# Patient Record
Sex: FEMALE | Race: WHITE
[De-identification: names, ages, dates, MRNs, and addresses within clinical notes are randomized per-mention and may not be internally consistent; named-entity substitution may affect disease eponyms.]

---

## 2020-10-29 NOTE — NUR
DUANE WARD admitted to room 421-1, with an admitting diagnosis of HYPERKALEMIA, on 
10/29/20 from ED via [facility], accompanied by STAFF. DUANE WARD introduced to 
surroundings, call light, bed controls, phone, TV, temperature control, lights, meal times, 
smoking policy, visitor policy, side rail policy, bathrooms and showers.  Patient Rights 
given to patient in the handbook. DUANE WARD verbalizes understanding that [facility] is not responsible for the loss or damage to any personal effects or valuables that 
are kept in the patients posession during their hospitalization.  The following Patient Care 
Plans were discussed with the PT: Discharge Planning, PAIN, AND ELECTROLYTE IMBALANCE. 
DUANE WARD verbalizes understanding of Interdisciplinary Patient Education. Patient 
and/or family were informed about the Rapid Response Team and its purpose.

## 2020-10-29 NOTE — NUR
OPEN WOUNDS NOTED ON LOWER LEGS. WEEPING, SKIN DISCOLORED. PT STATES SHE HAS OPEN SORES ON 
BACK OF HER THIGHS FROM SLIDING OUT OF BED AT [facility]. REFUSED TO TURN SO 
WOUNDS CAN BE ASSESSED.

## 2020-10-29 NOTE — ED GENERAL
General


Chief Complaint:  General Problems/Pain


Stated Complaint:  HIGH POTASSIUM


Nursing Triage Note:  


Pt to ED via EMS from [facility] for high potassium levels.  EMS 


reports yesterday levels were 6.6 and today's level was 6.1.  Pt has bilateral 


weeping wounds to lower extremeties.


Nursing Sepsis Screen:  No Definite Risk


Source of Information:  Patient


Exam Limitations:  No Limitations





History of Present Illness


Date Seen by Provider:  Oct 29, 2020


Time Seen by Provider:  14:15


Initial Comments


Patient is a 55-year-old female who presents to the emergency room today with a 

chief complaint of elevated serum potassium.  Patient resides at a local nursing

home and has been having daily labs drawn.  It was noted yesterday and today 

that the patient's potassium level was above 6.0.  Patient herself has no 

complaints at this time other than she has chronic lymphedema and drainage from 

wounds on her bilateral lower extremities.  Patient denies any chest pain, 

shortness of breath, abdominal pain, nausea, vomiting, diarrhea.  Patient denies

any recent febrile illnesses, cough, congestion, nasal drainage.





All other review of systems reviewed and negative except as stated above.


Timing/Duration:  2-3 Days





Allergies and Home Medications


Allergies


Coded Allergies:  


     diphenhydramine (Verified  Allergy, Unknown, 20)


   


   RESTLESS LEGSSD





Home Medications


Allopurinol 100 Mg Tablet, 100 MG PO BID, (Reported)


Baclofen 10 Mg Tablet, 10 MG PO QID, (Reported)


Cephalexin 500 Mg Capsule, 500 MG PO BID


   Prescribed by: ROBERT VILLALTA on 7/15/20 0932


Diclofenac Sodium 100 Gm Gel..gram., 1 APPLIC TD QID PRN for PAIN-BREAKTHROUGH, 

(Reported)


   APPLY 4 GRAMS TO EACH KNEE NOT TO EXCEED 32 GRAMS PER DAY 


Duloxetine HCl 30 Mg Capsule.dr, 30 MG PO DAILY, (Reported)


Fentanyl 1 Each Patch.td72, 75 MCG TD Q72H, (Reported)


Furosemide 40 Mg Tablet, 40 MG PO DAILY, (Reported)


Ibuprofen 200 Mg Capsule, 200 MG PO Q6H PRN for PAIN-MILD (1-4), (Reported)


Lactobacillus Acidophilus 1 Each Capsule, 1 EACH PO TIDWM


   Prescribed by: ROBERT VILLALTA on 7/15/20 0932


Levothyroxine Sodium 50 Mcg Tablet, 50 MCG PO DAILY, (Reported)


Metformin HCl 500 Mg Tablet, 500 MG PO DAILY, (Reported)


Metolazone 2.5 Mg Tablet, 2.5 MG PO DAILY, (Reported)


Nystatin 15 Gm Oint...g., 1 APPLIC TP BID, (Reported)


Oxycodone HCl/Acetaminophen 1 Each Tablet, 1 EACH PO Q6H PRN for PAIN-MODERATE 

(5-7), (Reported)


Pantoprazole Sodium 40 Mg Tablet.dr, 40 MG PO DAILY, (Reported)


Potassium Chloride 10 Meq Tab.er.prt, 20 MEQ PO DAILY, (Reported)


   TAKE TWO TABLETS 


Pramipexole Di-HCl 1 Mg Tablet, 1 MG PO TID, (Reported)


Sulfamethoxazole/Trimethoprim 1 Each Tablet, 1 EACH PO BID


   Prescribed by: ROBERT VILLALTA on 7/15/20 0932


Warfarin Sodium 10 Mg Tablet, 10 MG PO TUESDAY AND THURSDAY, (Reported)


   TAKE ONE TABLET IN THE EVENING OF EVERY TUESDAY AND WEDNESDAY 


Warfarin Sodium 5 Mg Tablet, 5 MG PO SUN MON WED FRI SAT, (Reported)


   TAKE ONE TABLET IN THE EVENING OF EVERY , MONDAY, WEDNESDAY, FRIDAY AND

SATURDAY FOR DVT 





Patient Home Medication List


Home Medication List Reviewed:  Yes





Review of Systems


Review of Systems


Constitutional:  no symptoms reported


EENTM:  no symptoms reported


Respiratory:  no symptoms reported; No short of breath


Cardiovascular:  no symptoms reported


Gastrointestinal:  no symptoms reported


Genitourinary:  no symptoms reported


Pregnant:  No


Musculoskeletal:  joint pain (right hip and thigh), muscle cramps


Skin:  lesions (bilateral lower extremities)





Past Medical-Social-Family Hx


Patient Social History


Alcohol Use:  Denies Use


Recreational Drug Use:  No


Smoking Status:  Former Smoker


Type Used:  Cigarettes


2nd Hand Smoke Exposure:  No


Recent Foreign Travel:  No


Contact w/Someone Who Travel:  No


Recent Infectious Disease Expo:  No


Recent Hopitalizations:  No


Physical Abuse:  No


Sexual Abuse:  No


Mistreated:  No


Fear:  No





Immunizations Up To Date


Tetanus Booster (TDap):  Unknown


Date of Pneumonia Vaccine:  2020





Seasonal Allergies


Seasonal Allergies:  No





Past Medical History


Surgeries:  Yes


Appendectomy,  Section, Gallbladder, Orthopedic


Respiratory:  Yes


Sleep Apnea


Cardiac:  No


Neurological:  Yes


Neuropathy


Genitourinary:  No


Gastrointestinal:  Yes


Gastroesophageal Reflux


Musculoskeletal:  Yes


Fibromyalgia


Endocrine:  Yes


Diabetes, Non-Insulin dep


HEENT:  No


Cancer:  No


Psychosocial:  No


Integumentary:  Yes


Recent Skin Changes


Blood Disorders:  No





Physical Exam


Vital Signs





Vital Signs - First Documented








 10/29/20





 14:30


 


Temp 35.7


 


Pulse 76


 


Resp 20


 


B/P (MAP) 133/91 (105)


 


Pulse Ox 97


 


O2 Delivery Room Air





Capillary Refill : Less Than 3 Seconds


Height, Weight, BMI


Height: '"


Weight: lbs. oz. kg; 68.00 BMI


Method:


General Appearance:  No Apparent Distress, Obese


Eyes:  Bilateral Eye Normal Inspection, Bilateral Eye PERRL, Bilateral Eye EOMI


Respiratory:  Lungs Clear, Normal Breath Sounds, No Accessory Muscle Use, No Res

piratory Distress


Cardiovascular:  Regular Rate, Rhythm, No Murmur


Gastrointestinal:  Normal Bowel Sounds, Non Tender, Other (morbid obsetiy )


Extremity:  Inflammation, Pedal Edema, Other (2 open wounds to the left leg 

about 2cm diameter, most dorsal lesion actively draining copious amounts of 

clear serosanguinous fluids)


Neurologic/Psychiatric:  Alert, Oriented x3, No Motor/Sensory Deficits, Normal 

Mood/Affect





Progress/Results/Core Measures


Suspected Sepsis


Recent Fever Within 48 Hours:  No


Infection Criteria Present:  None


New/Unexplained  Altered Menta:  No


Sepsis Screen:  No Definite Risk


SIRS


Temperature: 


Pulse: 76 


Respiratory Rate: 20


 


Laboratory Tests


10/29/20 14:35: White Blood Count 7.8


Blood Pressure 133 /91 


Mean: 105


 


Laboratory Tests


10/29/20 14:35: 


Creatinine 1.60H, INR Comment 2.7H, Platelet Count 433H








Results/Orders


Lab Results





Laboratory Tests








Test


 10/29/20


14:35 Range/Units


 


 


White Blood Count


 7.8 


 4.3-11.0


10^3/uL


 


Red Blood Count


 3.96 


 3.80-5.11


10^6/uL


 


Hemoglobin 11.2 L 11.5-16.0  g/dL


 


Hematocrit 36  35-52  %


 


Mean Corpuscular Volume 90  80-99  fL


 


Mean Corpuscular Hemoglobin 28  25-34  pg


 


Mean Corpuscular Hemoglobin


Concent 32 


 32-36  g/dL





 


Red Cell Distribution Width 17.2 H 10.0-14.5  %


 


Platelet Count


 433 H


 130-400


10^3/uL


 


Mean Platelet Volume 9.2  9.0-12.2  fL


 


Immature Granulocyte % (Auto) 1   %


 


Neutrophils (%) (Auto) 70  42-75  %


 


Lymphocytes (%) (Auto) 15  12-44  %


 


Monocytes (%) (Auto) 8  0-12  %


 


Eosinophils (%) (Auto) 7  0-10  %


 


Basophils (%) (Auto) 1  0-10  %


 


Neutrophils # (Auto)


 5.4 


 1.8-7.8


10^3/uL


 


Lymphocytes # (Auto)


 1.1 


 1.0-4.0


10^3/uL


 


Monocytes # (Auto)


 0.6 


 0.0-1.0


10^3/uL


 


Eosinophils # (Auto)


 0.5 H


 0.0-0.3


10^3/uL


 


Basophils # (Auto)


 0.1 


 0.0-0.1


10^3/uL


 


Immature Granulocyte # (Auto)


 0.1 


 0.0-0.1


10^3/uL


 


Prothrombin Time 28.7 H 12.2-14.7  SEC


 


INR Comment 2.7 H 0.8-1.4  


 


Sodium Level 127 L 135-145  MMOL/L


 


Potassium Level 6.1 H 3.6-5.0  MMOL/L


 


Chloride Level 97 L   MMOL/L


 


Carbon Dioxide Level 19 L 21-32  MMOL/L


 


Anion Gap 11  5-14  MMOL/L


 


Blood Urea Nitrogen 50 H 7-18  MG/DL


 


Creatinine


 1.60 H


 0.60-1.30


MG/DL


 


Estimat Glomerular Filtration


Rate 34 


  





 


BUN/Creatinine Ratio 31   


 


Glucose Level 133 H   MG/DL


 


Calcium Level 9.5  8.5-10.1  MG/DL








My Orders





Orders - TRACEY GONZALEZ MD


Basic Metabolic Panel (10/29/20 14:43)


Cbc With Automated Diff (10/29/20 14:43)


Protime With Inr (10/29/20 14:43)


Ed Iv/Invasive Line Start (10/29/20 14:43)


D50w (Emergency) Syringe (Dextrose 50% 5 (10/29/20 15:30)


Insulin (Regular) Human (Novolin R (Per (10/29/20 15:30)


Sodium Bicarbonate 8.4% Vial (Sodium Bic (10/29/20 15:30)


Ekg Tracing (10/29/20 15:23)


Furosemide  Injection (Lasix  Injection) (10/29/20 15:45)


Baclofen Tablet (Lioresal Tablet) (10/29/20 15:55)





Medications Given in ED





Current Medications








 Medications  Dose


 Ordered  Sig/Mildred


 Route  Start Time


 Stop Time Status Last Admin


Dose Admin


 


 Dextrose  25 ml  ONCE  ONCE


 IV  10/29/20 15:30


 10/29/20 15:31 DC 10/29/20 15:36


25 ML


 


 Insulin Human


 Regular  10 unit  ONCE  ONCE


 SC  10/29/20 15:30


 10/29/20 15:31 DC 10/29/20 15:35


10 UNIT


 


 Sodium Bicarbonate  50 meq  ONCE  ONCE


 IV  10/29/20 15:30


 10/29/20 15:31 DC 10/29/20 15:38


50 MEQ








Vital Signs/I&O











 10/29/20





 14:30


 


Temp 35.7


 


Pulse 76


 


Resp 20


 


B/P (MAP) 133/91 (105)


 


Pulse Ox 97


 


O2 Delivery Room Air





Capillary Refill : Less Than 3 Seconds








Blood Pressure Mean:                    105








Progress Note :  


   Time:  15:45


Progress Note


55-year-old female presents to the emergency room with a chief complaint of 

elevated potassium.  Evaluation today includes a physical exam basic laboratory 

studies including a CBC BM 7 and PT/INR.  An EKG was also obtained.  EKG shows a

normal sinus rhythm at 77 bpm with frequent PACs and supraventricular complexes.

 No ST segment elevations or depressions, no peaked T waves noted.


Patient CBC is unremarkable patient's BM 7 is remarkable for a serum potassium 

of 6.1 and an elevated serum creatinine.  Patient's INR is therapeutic.





Case is discussed with Dr. Delgado on for the hospitalist service.  He graciously

accepts this patient for observation admission and correction of her serum 

potassium issues.





ECG


Initial ECG Impression Date:  Oct 29, 2020


Initial ECG Impression Time:  15:41


Initial ECG Rate:  77


Initial ECG Rhythm:  Normal Sinus


Initial ECG Impression:  Normal





Departure


Communication (Admissions)


Time/Spoke to Admitting Phy:  15:44


Discussed with Dr Rubi; Accepts patient for admission Observation





Impression





   Primary Impression:  


   Acute hyperkalemia


   Additional Impressions:  


   Chronic renal disease


   Qualified Codes:  N18.9 - Chronic kidney disease, unspecified


   Morbid obesity


Disposition:  09 ADMITTED AS INPATIENT


Condition:  Stable





Admissions


Decision to Admit Reason:  Admit from ER (General)


Decision to Admit/Date:  Oct 29, 2020


Time/Decision to Admit Time:  15:44





Departure-Patient Inst.


Referrals:  


NYLA VILLAFANA DO (PCP)


Primary Care Physician











TRACEY GONZALEZ MD         Oct 29, 2020 15:44

## 2020-10-30 NOTE — NUR
CHEY/DEVANTE visited with patient for discharge planning. 



Plan: Patient is discharging back to [facility] today 10/30. 

 

[facility]: GAGAN Reaves contacted facility to set up  time. They 
informed her that she will need EMS. CHEY/DEVANTE filled out EMS form and provided to the nurse. 
Discharge faxed to facility. 



CHEY/DEVANTE informed the patient of discharge. She verbalized understanding. She reports this sw 
does not need to call her daughter at this time.

## 2020-10-30 NOTE — NUR
PT LEFT ROOM AT 1409 WITH [facility] EMT'S. IV DISCONTINUED FROM LEFT ARM AND PUREWICK 
DISCONTINUED. 4 EMT'S WERE ABLE TO GET PT TO STAND UP AND THIS RN AND A PCT MOVED HOSPITAL 
BED AND EMT MOVED GURNEY UNDER HER AND ABLE TO GET HER TO LAY DOWN ON SAID GURNEY TO 
TRANSPORT HER BACK TO [facility].

## 2020-10-30 NOTE — DISCHARGE SUMMARY
Discharge Summary


Hospital Course


Was the Problem List Reviewed?:  Yes


Problems/Dx:  


(1) Acute hyperkalemia


Status:  Acute


(2) Chronic renal disease


Status:  Chronic


Qualifiers:  


   Qualified Codes:  N18.3 - Chronic kidney disease, stage 3 (moderate)


Hospital Course


Date of Admission: Oct 29, 2020 at 16:34 


Admission Diagnosis:  Acute hyperkalemia





Family Physician/Provider:   





Date of Discharge: 10/30/20 


Discharge Diagnosis:  Acute hyperkalemia








Hospital Course:


Yaneli Romo is a 55-year-old female who was admitted with acute hyp

erkalemia.  Her potassium at been elevated above 6 during outpatient checks.  

Upon her arrival her potassium was 6.1.  She was treated with insulin and Lasix.

 Her potassium improved to 4.4 the following morning.  She has been taking a 20 

mEq potassium supplement as an outpatient.  This was discontinued.  She should 

have a repeat BMP on Monday.  She should follow-up with her primary care 

physician in about a week.














Labs and Pending Lab Test:


Laboratory Tests


10/29/20 14:35: 


White Blood Count 7.8, Red Blood Count 3.96, Hemoglobin 11.2L, Hematocrit 36, 

Mean Corpuscular Volume 90, Mean Corpuscular Hemoglobin 28, Mean Corpuscular 

Hemoglobin Concent 32, Red Cell Distribution Width 17.2H, Platelet Count 433H, 

Mean Platelet Volume 9.2, Immature Granulocyte % (Auto) 1, Neutrophils (%) 

(Auto) 70, Lymphocytes (%) (Auto) 15, Monocytes (%) (Auto) 8, Eosinophils (%) 

(Auto) 7, Basophils (%) (Auto) 1, Neutrophils # (Auto) 5.4, Lymphocytes # (Auto)

1.1, Monocytes # (Auto) 0.6, Eosinophils # (Auto) 0.5H, Basophils # (Auto) 0.1, 

Immature Granulocyte # (Auto) 0.1, Prothrombin Time 28.7H, INR Comment 2.7H, S

odium Level 127L, Potassium Level 6.1H, Chloride Level 97L, Carbon Dioxide Level

19L, Anion Gap 11, Blood Urea Nitrogen 50H, Creatinine 1.60H, Estimat Glomerular

Filtration Rate 34, BUN/Creatinine Ratio 31, Glucose Level 133H, Calcium Level 

9.5


10/29/20 20:32: Glucometer 92


10/30/20 05:53: 


Sodium Level 130L, Potassium Level 4.4, Chloride Level 99, Carbon Dioxide Level 

19L, Anion Gap 12, Blood Urea Nitrogen 45H, Creatinine 1.46H, Estimat Glomerular

Filtration Rate 37, BUN/Creatinine Ratio 31, Glucose Level 99, Calcium Level 

9.2, Corrected Calcium 9.8, Total Bilirubin 0.4, Aspartate Amino Transf 

(AST/SGOT) 20, Alanine Aminotransferase (ALT/SGPT) 21, Alkaline Phosphatase 116,

Total Protein 7.7, Albumin 3.3


10/30/20 11:42: Glucometer 134H





Home Meds


Active


Reported


Silver Sulfadiazine 50 Gm Cream..g. 1 Applic TOP BID


     APPLY TO BILATERAL ANKLE/LEFT SHIN ULCER


Tylenol (Acetaminophen) 325 Mg Tablet 650 Mg PO Q4H PRN


Pramipexole Dihydrochloride (Pramipexole Di-HCl) 1 Mg Tablet 1 Mg PO 

0900,1200,1700


Oxycodone HCl 15 Mg Tablet 15 Mg PO Q6H PRN


Warfarin Sodium 10 Mg Tablet 5 Mg PO THUR


     TAKES  OF 10MG TAB AT 1700


Protonix (Pantoprazole Sodium) 40 Mg Tablet.dr 40 Mg PO DAILY


Nystatin 15 Gm Oint...g. 1 Applic TP BID


Metolazone 2.5 Mg Tablet 2.5 Mg PO DAILY


Metformin HCl 500 Mg Tablet 500 Mg PO DAILY


Levothyroxine Sodium 50 Mcg Tablet 50 Mcg PO DAILY


Ibuprofen 200 Mg Capsule 200 Mg PO Q6H PRN


Diclofenac Sodium 100 Gm Gel..gram. 1 Applic TD Q6H PRN


     APPLY 4 GRAMS TO EACH KNEE NOT TO EXCEED 32 GRAMS PER DAY


Warfarin Sodium 10 Mg Tablet 10 Mg PO WILDER,MO,TU,WE,FR,SA


     TAKES AT 1700 ON SUN,MON,TUE,WEF,FRI,SAT


Baclofen 10 Mg Tablet 10 Mg PO QID


Lasix (Furosemide) 40 Mg Tablet 40 Mg PO DAILY


Cymbalta (Duloxetine HCl) 30 Mg Capsule.dr 30 Mg PO DAILY


Allopurinol 100 Mg Tablet 100 Mg PO BID


Assessment/Pt Instructions


Take medications as prescribed. Stop taking potassium supplements. Repeat labs 

early next week. Follow up with your PCP.


Discharge Planning:  <30 minutes discharge planning





Discharge Instructions


Discharge Diet:  No Restrictions


Activity as Tolerated:  Yes





Discharge Physical Examination


Vital Signs





Vital Signs








  Date Time  Temp Pulse Resp B/P (MAP) Pulse Ox O2 Delivery O2 Flow Rate FiO2


 


10/30/20 11:30 36.4 74 16 141/73 (95) 94 Room Air  








General Appearance:  No Apparent Distress, Obese


Respiratory:  Lungs Clear, Normal Breath Sounds, No Respiratory Distress


Cardiovascular:  Regular Rate, Rhythm, No Edema, No Murmur


Gastrointestinal:  Normal Bowel Sounds, Non Tender, Soft


Extremity:  Normal Inspection, Non Tender, Inflammation, Swelling


Skin:  Warm/Dry, Erythema (lower extremity weaping wounds)


Neurologic/Psychiatric:  Alert, Oriented x3, Depressed Affect


Allergies:  


Coded Allergies:  


     diphenhydramine (Verified  Allergy, Unknown, 10/29/20)


   


   RESTLESS LEGSSD





Copy


Copies To 1:   NYLA VILLAFANA DO





Discharge Summary


Date of Admission


Oct 29, 2020 at 16:34


Date of Discharge





Discharge Date:  Oct 30, 2020


Discharge Time:  12:14


Admission Diagnosis


Hyperkalemia


Discharge Diagnosis





(1) Acute hyperkalemia


Status:  Acute





Clinical Quality Measures


DVT/VTE Risk/Contraindication:


Risk Factor Score Per Nursin


RFS Level Per Nursing on Admit:  4+=Very High











LISSETTE TORRES MD              Oct 30, 2020 12:14

## 2020-10-30 NOTE — NUR
PT REFUSED IV LASIX THIS MORNING AND DR TORRES SAID IT COULD BE CHANGED TO PO LASIX WHICH PT 
AGAIN REFUSED AND SAID SHE WAS TOLD SHE COULD TAKE THAT MEDICATION AT [facility] 
SINCE SHE IS BEING DISCHARGED THIS AFTERNOON.

## 2020-10-30 NOTE — NUR
PT HAS SIGNED D/C PAPER AND HAS THE REST OF THE PAPERWORK IN [employer]. EMS SUPERVISOR AND 
DISPATCH HAVE BEEN CONTACTED BY THIS RN AND WILL BE OUT AS SOON AS THEY CAN TO TAKE HER BACK 
TO [facility]. PCT STAFF GAVE HER A BED BATH AND HAS ALL HER BELONGINGS READY TO 
GO.

## 2020-10-30 NOTE — NUR
THE MED REC HAS BEEN ENTERED USING THE MAR FROM [facility]\



I CALLED THE FACILITY TO CLARIFY THE PTS PAIN MEDICATIONS- I WAS TOLD THE PT IS NO LONGER 
USING FENTANYL PATCHES OR PERCOCET AND IS ONLY USING THE OXYCODONE 15MG Q6H PRN (ACCORDING 
TO THE NURSE THIS IS NO LONGER SCHEDULED).

## 2020-10-30 NOTE — NUR
DR TORRES ASKED FOR NURSE WRITER TO CALL DR MOARN RE PT REQUESTING TO SEE HIM. THIS RN 
CALLED DR MORAN AND HE SAID HE COULD COME SEE HER AT 1400 TODAY BUT IF SHE HAD TO LEAVE 
BEFORE THAT IT WAS OK BC HE SEES HER VIA TELEHEALTH AT [facility] [place].

## 2020-12-05 ENCOUNTER — HOSPITAL ENCOUNTER (OUTPATIENT)
Dept: HOSPITAL 75 - LABNPT | Age: 55
End: 2020-12-05
Attending: INTERNAL MEDICINE
Payer: MEDICAID

## 2020-12-05 DIAGNOSIS — I26.99: Primary | ICD-10-CM

## 2020-12-05 DIAGNOSIS — I82.442: ICD-10-CM

## 2020-12-05 LAB
INR PPP: 5.3 (ref 0.8–1.4)
PROTHROMBIN TIME: 48.7 SEC (ref 12.2–14.7)

## 2020-12-05 PROCEDURE — 85610 PROTHROMBIN TIME: CPT

## 2020-12-11 ENCOUNTER — HOSPITAL ENCOUNTER (OUTPATIENT)
Dept: HOSPITAL 75 - WOUNDCARE | Age: 55
End: 2020-12-11
Attending: SURGERY
Payer: MEDICAID

## 2020-12-11 DIAGNOSIS — L03.115: ICD-10-CM

## 2020-12-11 DIAGNOSIS — I89.0: ICD-10-CM

## 2020-12-11 DIAGNOSIS — M62.84: ICD-10-CM

## 2020-12-11 DIAGNOSIS — L97.322: ICD-10-CM

## 2020-12-11 DIAGNOSIS — I96: ICD-10-CM

## 2020-12-11 DIAGNOSIS — L22: Primary | ICD-10-CM

## 2020-12-11 DIAGNOSIS — M62.3: ICD-10-CM

## 2020-12-11 DIAGNOSIS — L03.116: ICD-10-CM

## 2020-12-11 DIAGNOSIS — L97.222: ICD-10-CM

## 2020-12-11 DIAGNOSIS — L97.312: ICD-10-CM

## 2020-12-11 DIAGNOSIS — L97.121: ICD-10-CM

## 2020-12-11 DIAGNOSIS — L97.212: ICD-10-CM

## 2020-12-11 DIAGNOSIS — E66.01: ICD-10-CM

## 2020-12-17 ENCOUNTER — HOSPITAL ENCOUNTER (EMERGENCY)
Dept: HOSPITAL 75 - ER | Age: 55
Discharge: HOME | End: 2020-12-17
Payer: MEDICAID

## 2020-12-17 ENCOUNTER — HOSPITAL ENCOUNTER (OUTPATIENT)
Dept: HOSPITAL 75 - WOUNDCARE | Age: 55
End: 2020-12-17
Attending: SURGERY
Payer: MEDICAID

## 2020-12-17 VITALS — BODY MASS INDEX: 44.41 KG/M2 | HEIGHT: 67.99 IN | WEIGHT: 293 LBS

## 2020-12-17 VITALS — SYSTOLIC BLOOD PRESSURE: 122 MMHG | DIASTOLIC BLOOD PRESSURE: 77 MMHG

## 2020-12-17 DIAGNOSIS — Z88.8: ICD-10-CM

## 2020-12-17 DIAGNOSIS — L97.923: ICD-10-CM

## 2020-12-17 DIAGNOSIS — M62.84: ICD-10-CM

## 2020-12-17 DIAGNOSIS — K21.9: ICD-10-CM

## 2020-12-17 DIAGNOSIS — L97.222: ICD-10-CM

## 2020-12-17 DIAGNOSIS — I96: Primary | ICD-10-CM

## 2020-12-17 DIAGNOSIS — L97.129: ICD-10-CM

## 2020-12-17 DIAGNOSIS — Z79.84: ICD-10-CM

## 2020-12-17 DIAGNOSIS — L22: ICD-10-CM

## 2020-12-17 DIAGNOSIS — L03.115: ICD-10-CM

## 2020-12-17 DIAGNOSIS — E11.9: ICD-10-CM

## 2020-12-17 DIAGNOSIS — L97.121: ICD-10-CM

## 2020-12-17 DIAGNOSIS — Z74.01: ICD-10-CM

## 2020-12-17 DIAGNOSIS — M62.3: ICD-10-CM

## 2020-12-17 DIAGNOSIS — I89.0: ICD-10-CM

## 2020-12-17 DIAGNOSIS — E66.01: ICD-10-CM

## 2020-12-17 DIAGNOSIS — Z79.01: ICD-10-CM

## 2020-12-17 DIAGNOSIS — L03.116: ICD-10-CM

## 2020-12-17 DIAGNOSIS — L97.312: ICD-10-CM

## 2020-12-17 DIAGNOSIS — L97.322: ICD-10-CM

## 2020-12-17 DIAGNOSIS — E66.01: Primary | ICD-10-CM

## 2020-12-17 DIAGNOSIS — Z87.891: ICD-10-CM

## 2020-12-17 DIAGNOSIS — L97.212: ICD-10-CM

## 2020-12-17 LAB
ALBUMIN SERPL-MCNC: 2.6 GM/DL (ref 3.2–4.5)
ALP SERPL-CCNC: 76 U/L (ref 40–136)
ALT SERPL-CCNC: 19 U/L (ref 0–55)
AMORPH SED URNS QL MICRO: (no result) /LPF
APTT BLD: 65 SEC (ref 24–35)
APTT PPP: YELLOW S
BACTERIA #/AREA URNS HPF: (no result) /HPF
BASOPHILS # BLD AUTO: 0 10^3/UL (ref 0–0.1)
BASOPHILS NFR BLD AUTO: 0 % (ref 0–10)
BILIRUB SERPL-MCNC: 0.3 MG/DL (ref 0.1–1)
BILIRUB UR QL STRIP: NEGATIVE
BUN/CREAT SERPL: 21
CALCIUM SERPL-MCNC: 8.6 MG/DL (ref 8.5–10.1)
CHLORIDE SERPL-SCNC: 101 MMOL/L (ref 98–107)
CO2 SERPL-SCNC: 23 MMOL/L (ref 21–32)
CREAT SERPL-MCNC: 1.12 MG/DL (ref 0.6–1.3)
EOSINOPHIL # BLD AUTO: 0.4 10^3/UL (ref 0–0.3)
EOSINOPHIL NFR BLD AUTO: 4 % (ref 0–10)
FIBRINOGEN PPP-MCNC: CLEAR MG/DL
GFR SERPLBLD BASED ON 1.73 SQ M-ARVRAT: 51 ML/MIN
GLUCOSE SERPL-MCNC: 112 MG/DL (ref 70–105)
GLUCOSE UR STRIP-MCNC: NEGATIVE MG/DL
HCT VFR BLD CALC: 33 % (ref 35–52)
HGB BLD-MCNC: 10.1 G/DL (ref 11.5–16)
INR PPP: 3.6 (ref 0.8–1.4)
KETONES UR QL STRIP: NEGATIVE
LEUKOCYTE ESTERASE UR QL STRIP: (no result)
LYMPHOCYTES # BLD AUTO: 1.4 10^3/UL (ref 1–4)
LYMPHOCYTES NFR BLD AUTO: 14 % (ref 12–44)
MANUAL DIFFERENTIAL PERFORMED BLD QL: NO
MCH RBC QN AUTO: 29 PG (ref 25–34)
MCHC RBC AUTO-ENTMCNC: 31 G/DL (ref 32–36)
MCV RBC AUTO: 92 FL (ref 80–99)
MONOCYTES # BLD AUTO: 1 10^3/UL (ref 0–1)
MONOCYTES NFR BLD AUTO: 10 % (ref 0–12)
NEUTROPHILS # BLD AUTO: 7 10^3/UL (ref 1.8–7.8)
NEUTROPHILS NFR BLD AUTO: 70 % (ref 42–75)
NITRITE UR QL STRIP: POSITIVE
PH UR STRIP: 6.5 [PH] (ref 5–9)
PLATELET # BLD: 508 10^3/UL (ref 130–400)
PMV BLD AUTO: 9.4 FL (ref 9–12.2)
POTASSIUM SERPL-SCNC: 3.3 MMOL/L (ref 3.6–5)
PROT SERPL-MCNC: 7.7 GM/DL (ref 6.4–8.2)
PROT UR QL STRIP: NEGATIVE
PROTHROMBIN TIME: 36.2 SEC (ref 12.2–14.7)
RBC #/AREA URNS HPF: (no result) /HPF
SODIUM SERPL-SCNC: 136 MMOL/L (ref 135–145)
SP GR UR STRIP: 1.01 (ref 1.02–1.02)
SQUAMOUS #/AREA URNS HPF: (no result) /HPF
WBC # BLD AUTO: 10 10^3/UL (ref 4.3–11)
WBC #/AREA URNS HPF: (no result) /HPF

## 2020-12-17 PROCEDURE — 87040 BLOOD CULTURE FOR BACTERIA: CPT

## 2020-12-17 PROCEDURE — 87804 INFLUENZA ASSAY W/OPTIC: CPT

## 2020-12-17 PROCEDURE — 36415 COLL VENOUS BLD VENIPUNCTURE: CPT

## 2020-12-17 PROCEDURE — 87088 URINE BACTERIA CULTURE: CPT

## 2020-12-17 PROCEDURE — 87205 SMEAR GRAM STAIN: CPT

## 2020-12-17 PROCEDURE — 85025 COMPLETE CBC W/AUTO DIFF WBC: CPT

## 2020-12-17 PROCEDURE — 87186 SC STD MICRODIL/AGAR DIL: CPT

## 2020-12-17 PROCEDURE — 85730 THROMBOPLASTIN TIME PARTIAL: CPT

## 2020-12-17 PROCEDURE — 85610 PROTHROMBIN TIME: CPT

## 2020-12-17 PROCEDURE — 87077 CULTURE AEROBIC IDENTIFY: CPT

## 2020-12-17 PROCEDURE — 80053 COMPREHEN METABOLIC PANEL: CPT

## 2020-12-17 PROCEDURE — 71045 X-RAY EXAM CHEST 1 VIEW: CPT

## 2020-12-17 PROCEDURE — 87070 CULTURE OTHR SPECIMN AEROBIC: CPT

## 2020-12-17 PROCEDURE — 83605 ASSAY OF LACTIC ACID: CPT

## 2020-12-17 PROCEDURE — 81000 URINALYSIS NONAUTO W/SCOPE: CPT

## 2020-12-17 NOTE — ED INTEGUMENTARY GENERAL
General


Chief Complaint:  Skin/Wound Problems


Stated Complaint:  LOWER EXT CELLULITITS


Nursing Triage Note:  


PT BROUGHT IN BY [facility] FROM [facility] FOR POSSIBLE CELLULITIS OF 


LEFT LEG. PT HAS BEEN FOLLOWING WITH WOUND CARE.





History of Present Illness


Date Seen by Provider:  Dec 17, 2020


Time Seen by Provider:  15:23


Initial Comments


55-year-old  female with morbid obesity, nonambulatory presents for 

chronic ulcers to her left lower extremity.  She has been seen by Dr. Lama for 

several months for wound care.  He evaluated her via telehealth today and felt 

the wounds were unchanged.  She was started on doxycycline 2 days ago by Dr. Lama  He notified her primary care provider, Dr. Wolf, of the update on the

wounds and she was sent here by Dr. Wolf's office.


Timing/Duration:  getting worse


Location:  extremities (Left lower)


Associated Symptoms:  change in skin texture





Allergies and Home Medications


Allergies


Coded Allergies:  


     diphenhydramine (Verified  Allergy, Unknown, 10/29/20)


   RESTLESS LEGSSD





Home Medications


Acetaminophen 325 Mg Tablet, 650 MG PO Q4H PRN for PAIN-MILD (1-4), (Reported)


Allopurinol 100 Mg Tablet, 100 MG PO BID, (Reported)


Baclofen 10 Mg Tablet, 10 MG PO QID, (Reported)


Diclofenac Sodium 100 Gm Gel..gram., 1 APPLIC TD Q6H PRN for PAIN-BREAKTHROUGH, 

(Reported)


   APPLY 4 GRAMS TO EACH KNEE NOT TO EXCEED 32 GRAMS PER DAY 


Duloxetine HCl 30 Mg Capsule.dr, 30 MG PO DAILY, (Reported)


Furosemide 40 Mg Tablet, 40 MG PO DAILY, (Reported)


Ibuprofen 200 Mg Capsule, 200 MG PO Q6H PRN for PAIN-MILD (1-4), (Reported)


Levothyroxine Sodium 50 Mcg Tablet, 50 MCG PO DAILY, (Reported)


Metformin HCl 500 Mg Tablet, 500 MG PO DAILY, (Reported)


Metolazone 2.5 Mg Tablet, 2.5 MG PO DAILY, (Reported)


Nystatin 15 Gm Oint...g., 1 APPLIC TP BID, (Reported)


Oxycodone HCl 15 Mg Tablet, 15 MG PO Q6H PRN for PAIN-SEVERE (8-10), (Reported)


Pantoprazole Sodium 40 Mg Tablet.dr, 40 MG PO DAILY, (Reported)


Pramipexole Di-HCl 1 Mg Tablet, 1 MG PO 0900,1200,1700, (Reported)


Silver Sulfadiazine 50 Gm Cream..g., 1 APPLIC TOP BID, (Reported)


   APPLY TO BILATERAL ANKLE/LEFT SHIN ULCER 


Warfarin Sodium 10 Mg Tablet, 10 MG PO WILDER,MO,TU,WE,FR,SA, (Reported)


   TAKES AT 1700 ON SUN,MON,TUE,WEF,FRI,SAT 


Warfarin Sodium 10 Mg Tablet, 5 MG PO THUR, (Reported)


   TAKES  OF 10MG TAB AT 1700 





Patient Home Medication List


Home Medication List Reviewed:  Yes





Review of Systems


Review of Systems


Constitutional:  no symptoms reported, see HPI


Skin:  see HPI, other (Skin ulcers left lower extremity)





All Other Systems Reviewed


Negative Unless Noted:  Yes





Past Medical-Social-Family Hx


Past Med/Social Hx:  Reviewed Nursing Past Med/Soc Hx


Patient Social History


Alcohol Use:  Denies Use


Recreational Drug Use:  No


Smoking Status:  Former Smoker


Type Used:  Cigarettes


2nd Hand Smoke Exposure:  No


Recent Foreign Travel:  No


Contact w/Someone Who Travel:  No


Recent Infectious Disease Expo:  No


Recent Hopitalizations:  No





Immunizations Up To Date


Tetanus Booster (TDap):  Unknown


Date of Pneumonia Vaccine:  Oct 1, 2019


Date of Influenza Vaccine:  Oct 1, 2020





Seasonal Allergies


Seasonal Allergies:  No





Past Medical History


Surgeries:  Yes


Appendectomy,  Section, Gallbladder, Orthopedic


Respiratory:  Yes


Sleep Apnea


Cardiac:  No


Neurological:  No


Neuropathy


Genitourinary:  No


Gastrointestinal:  Yes


Gastroesophageal Reflux


Musculoskeletal:  Yes


Fibromyalgia


Endocrine:  No


Diabetes, Non-Insulin dep


HEENT:  No


Cancer:  No


Psychosocial:  No


Integumentary:  Yes


Recent Skin Changes


Blood Disorders:  No





Physical Exam


Vital Signs





Vital Signs - First Documented








 20





 15:23


 


Temp 37.0


 


Pulse 107


 


Resp 24


 


B/P (MAP) 122/85 (97)


 


Pulse Ox 99


 


O2 Delivery Nasal Cannula


 


O2 Flow Rate 3.00





Capillary Refill : Less Than 3 Seconds


General Appearance:  WD/WN, mild distress, obese


HEENT:  PERRL/EOMI, normal ENT inspection, TMs normal, pharynx normal


Neck:  non-tender, full range of motion, supple, normal inspection


Cardiovascular:  regular rate, rhythm, other (1+ bilateral pedal pulses, cap 

refill less than 3 seconds bilateral lower extremities.)


Respiratory:  chest non-tender, lungs clear


Gastrointestinal:  normal bowel sounds, non tender, soft


Back:  normal inspection, other (No skin breakdown to back)


Extremities:  normal range of motion


Neurologic/Psychiatric:  no motor/sensory deficits, alert, normal mood/affect, 

oriented x 3


Skin:  normal color, warm/dry, other (Stage 1 ulcer to left upper thigh, 

posterior, no active drainage)


Skin Problem Location:  lower extremities (Left lower leg)


Skin Problem Character:  drainage (Malodorous), other (2 ulcers to left lower 

leg, necrotic, purulent drainage)





Progress/Results/Core Measures


Results/Orders


Lab Results





Laboratory Tests








Test


 20


15:34 20


15:50 Range/Units


 


 


White Blood Count


 10.0 


 


 4.3-11.0


10^3/uL


 


Red Blood Count


 3.55 L


 


 3.80-5.11


10^6/uL


 


Hemoglobin 10.1 L  11.5-16.0  g/dL


 


Hematocrit 33 L  35-52  %


 


Mean Corpuscular Volume 92   80-99  fL


 


Mean Corpuscular Hemoglobin 29   25-34  pg


 


Mean Corpuscular Hemoglobin


Concent 31 L


 


 32-36  g/dL





 


Red Cell Distribution Width 21.2 H  10.0-14.5  %


 


Platelet Count


 508 H


 


 130-400


10^3/uL


 


Mean Platelet Volume 9.4   9.0-12.2  fL


 


Immature Granulocyte % (Auto) 1    %


 


Neutrophils (%) (Auto) 70   42-75  %


 


Lymphocytes (%) (Auto) 14   12-44  %


 


Monocytes (%) (Auto) 10   0-12  %


 


Eosinophils (%) (Auto) 4   0-10  %


 


Basophils (%) (Auto) 0   0-10  %


 


Neutrophils # (Auto)


 7.0 


 


 1.8-7.8


10^3/uL


 


Lymphocytes # (Auto)


 1.4 


 


 1.0-4.0


10^3/uL


 


Monocytes # (Auto)


 1.0 


 


 0.0-1.0


10^3/uL


 


Eosinophils # (Auto)


 0.4 H


 


 0.0-0.3


10^3/uL


 


Basophils # (Auto)


 0.0 


 


 0.0-0.1


10^3/uL


 


Immature Granulocyte # (Auto)


 0.1 


 


 0.0-0.1


10^3/uL


 


Prothrombin Time 36.2 H  12.2-14.7  SEC


 


INR Comment 3.6 H  0.8-1.4  


 


Activated Partial


Thromboplast Time 65 H


 


 24-35  SEC





 


Sodium Level 136   135-145  MMOL/L


 


Potassium Level 3.3 L  3.6-5.0  MMOL/L


 


Chloride Level 101     MMOL/L


 


Carbon Dioxide Level 23   21-32  MMOL/L


 


Anion Gap 12   5-14  MMOL/L


 


Blood Urea Nitrogen 24 H  7-18  MG/DL


 


Creatinine


 1.12 


 


 0.60-1.30


MG/DL


 


Estimat Glomerular Filtration


Rate 51 


 


  





 


BUN/Creatinine Ratio 21    


 


Glucose Level 112 H    MG/DL


 


Lactic Acid Level


 2.39 *H


 


 0.50-2.00


MMOL/L


 


Calcium Level 8.6   8.5-10.1  MG/DL


 


Corrected Calcium 9.7   8.5-10.1  MG/DL


 


Total Bilirubin 0.3   0.1-1.0  MG/DL


 


Aspartate Amino Transf


(AST/SGOT) 21 


 


 5-34  U/L





 


Alanine Aminotransferase


(ALT/SGPT) 19 


 


 0-55  U/L





 


Alkaline Phosphatase 76     U/L


 


Total Protein 7.7   6.4-8.2  GM/DL


 


Albumin 2.6 L  3.2-4.5  GM/DL


 


Urine Color  YELLOW   


 


Urine Clarity  CLEAR   


 


Urine pH  6.5  5-9  


 


Urine Specific Gravity  1.010 L 1.016-1.022  


 


Urine Protein  NEGATIVE  NEGATIVE  


 


Urine Glucose (UA)  NEGATIVE  NEGATIVE  


 


Urine Ketones  NEGATIVE  NEGATIVE  


 


Urine Nitrite  POSITIVE H NEGATIVE  


 


Urine Bilirubin  NEGATIVE  NEGATIVE  


 


Urine Urobilinogen  0.2  < = 1.0  MG/DL


 


Urine Leukocyte Esterase  2+ H NEGATIVE  


 


Urine RBC (Auto)  1+ H NEGATIVE  


 


Urine RBC  NONE   /HPF


 


Urine WBC  5-10 H  /HPF


 


Urine Squamous Epithelial


Cells 


 0-2 


  /HPF





 


Urine Crystals  NONE   /LPF


 


Urine Amorphous Sediment


 


 FEW MAHESH


URATES H  /LPF





 


Urine Bacteria  FEW H  /HPF


 


Urine Casts  NONE   /LPF


 


Urine Mucus  NEGATIVE   /LPF


 


Urine Culture Indicated  YES   








Micro Results





Microbiology


20 Influenza Types A,B Antigen (DARIO) - Final, Complete


           





My Orders





Orders - LACI PARRISH


Cbc With Automated Diff (20 15:43)


Comprehensive Metabolic Panel (20 15:43)


Blood Culture (20 15:43)


Urinalysis (20 15:43)


Urine Culture (20 15:43)


Protime With Inr (20 15:43)


Partial Thromboplastin Time (20 15:43)


Chest 1 View, Ap/Pa Only (20 15:43)


Ed Iv/Invasive Line Start (20 15:43)


O2 (20 15:43)


Wound Culture (20 15:43)


Influenza A And B Antigens (20 15:43)


Lactic Acid Analyzer (20 15:43)


Ed Iv/Invasive Line Start (20 16:01)


Ns Iv 1000 Ml (Sodium Chloride 0.9%) (20 16:15)


Ondansetron Injection (Zofran Injectio (20 16:45)





Medications Given in ED





Current Medications








 Medications  Dose


 Ordered  Sig/Mildred


 Route  Start Time


 Stop Time Status Last Admin


Dose Admin


 


 Ondansetron HCl  8 mg  ONCE  ONCE


 IVP  20 16:45


 20 16:46 DC 20 16:45


8 MG








Vital Signs/I&O











 20





 15:23 15:23 17:01


 


Temp  37.0 


 


Pulse  107 100


 


Resp  24 20


 


B/P (MAP)  122/85 (97) 122/77


 


Pulse Ox  99 96


 


O2 Delivery Nasal Cannula Nasal Cannula Room Air


 


O2 Flow Rate 3.00 3.00 














Blood Pressure Mean:                    97











Progress


Progress Note :  


   Time:  15:23


Progress Note


Patient seen and evaluated, will obtain labs, culture obtained from wound.


1600 Dr. Lama in ED, agreed wounds will probably not heal on their own, however

the patient is not septic at this time or requiring admission for treatment. 

Consider Coumadin Necrosis or Cardiology eval for vascular assessment, can be 

completed outpatient. 


1630 Spoke to Dr. Man, reviewed assessment and labs, patient stable at this 

time, no sepsis or other reason to admit, he will follow on outpatient basis. 

Eventual treatment will be amputation, but no indication for immediate need. 

Discussed this with patient, will return to [facility] for would 

care. [facility] notified, will transport patient. Attempted to call [facility] times for report on patient and plan to return.  Phone not 

answered.


1645 EMS and fire department, assisted with transfer of patient.  Discharge 

instructions and return precautions reviewed with the patient. 


1715 Phone call from [facility], updated on plan of care.





Departure


Impression





   Primary Impression:  


   Morbid obesity


   Additional Impressions:  


   Ulcer of left lower extremity


   Qualified Codes:  L97.923 - Non-pressure chronic ulcer of unspecified part of

   left lower leg with necrosis of muscle


   Bedbound


Disposition:   HOME, SELF-CARE


Condition:  Stable





Departure-Patient Inst.


Decision time for Depature:  16:30


Referrals:  


NYLA WOLF DO (PCP/Family)


Primary Care Physician


Patient Instructions:  Wound Care (DC)





Add. Discharge Instructions:  


Continue wound care and medications per Dr. Lama.


Schedule follow-up for 1 week with Dr. Man.


Elevate bilateral lower extremities as tolerated.


Change position every 2 hours.


Return to the emergency department for new, urgent healthcare needs.





All discharge instructions reviewed with patient and/or family. Voiced 

understanding.





Copy


Copies To 1:   LUIS MIGUEL MAN DO; JAVIER LAMA MD; NYLA WOLF AMY ARNP                  Dec 17, 2020 16:46

## 2020-12-17 NOTE — DIAGNOSTIC IMAGING REPORT
INDICATION: Sepsis.



TECHNIQUE: Frontal chest obtained at 04:20 p.m. and compared to

07/14/2020.



FINDINGS: There is prominent cardiomegaly. There is worsening

central vascular congestion. There is diffuse interstitial edema

versus infiltrate. There is no consolidation or pleural fluid.



IMPRESSION: Cardiomegaly with central vascular congestion with

worsening interstitial edema versus infiltrate. Follow-up is

recommended.



Dictated by: 



  Dictated on workstation # [id number]

## 2020-12-31 ENCOUNTER — HOSPITAL ENCOUNTER (OUTPATIENT)
Dept: HOSPITAL 75 - WOUNDCARE | Age: 55
End: 2020-12-31
Attending: SURGERY
Payer: MEDICAID

## 2020-12-31 DIAGNOSIS — M62.84: ICD-10-CM

## 2020-12-31 DIAGNOSIS — L97.312: ICD-10-CM

## 2020-12-31 DIAGNOSIS — M62.3: ICD-10-CM

## 2020-12-31 DIAGNOSIS — L97.322: ICD-10-CM

## 2020-12-31 DIAGNOSIS — I96: ICD-10-CM

## 2020-12-31 DIAGNOSIS — L97.212: ICD-10-CM

## 2020-12-31 DIAGNOSIS — L97.122: ICD-10-CM

## 2020-12-31 DIAGNOSIS — L97.222: ICD-10-CM

## 2020-12-31 DIAGNOSIS — E66.01: ICD-10-CM

## 2020-12-31 DIAGNOSIS — I89.0: Primary | ICD-10-CM

## 2020-12-31 DIAGNOSIS — L22: ICD-10-CM

## 2021-01-06 ENCOUNTER — HOSPITAL ENCOUNTER (OUTPATIENT)
Dept: HOSPITAL 75 - WOUNDCARE | Age: 56
End: 2021-01-06
Attending: SURGERY
Payer: MEDICAID

## 2021-01-06 DIAGNOSIS — M62.3: ICD-10-CM

## 2021-01-06 DIAGNOSIS — I89.0: Primary | ICD-10-CM

## 2021-01-06 DIAGNOSIS — M62.84: ICD-10-CM

## 2021-01-06 DIAGNOSIS — L22: ICD-10-CM

## 2021-01-06 DIAGNOSIS — L97.312: ICD-10-CM

## 2021-01-06 DIAGNOSIS — E66.01: ICD-10-CM

## 2021-01-06 DIAGNOSIS — L97.322: ICD-10-CM

## 2021-01-06 DIAGNOSIS — L97.121: ICD-10-CM

## 2021-01-06 DIAGNOSIS — L97.222: ICD-10-CM

## 2021-01-21 ENCOUNTER — HOSPITAL ENCOUNTER (OUTPATIENT)
Dept: HOSPITAL 75 - WOUNDCARE | Age: 56
End: 2021-01-21
Attending: SURGERY
Payer: MEDICAID

## 2021-01-21 DIAGNOSIS — I89.0: Primary | ICD-10-CM

## 2021-01-21 DIAGNOSIS — L97.312: ICD-10-CM

## 2021-01-21 DIAGNOSIS — L22: ICD-10-CM

## 2021-01-21 DIAGNOSIS — M62.84: ICD-10-CM

## 2021-01-21 DIAGNOSIS — L97.122: ICD-10-CM

## 2021-01-21 DIAGNOSIS — M62.3: ICD-10-CM

## 2021-01-21 DIAGNOSIS — L97.322: ICD-10-CM

## 2021-01-21 DIAGNOSIS — I96: ICD-10-CM

## 2021-01-21 DIAGNOSIS — E66.01: ICD-10-CM

## 2021-01-21 DIAGNOSIS — L97.222: ICD-10-CM

## 2021-02-04 ENCOUNTER — HOSPITAL ENCOUNTER (OUTPATIENT)
Dept: HOSPITAL 75 - WOUNDCARE | Age: 56
End: 2021-02-04
Attending: SURGERY
Payer: MEDICAID

## 2021-02-04 DIAGNOSIS — I96: Primary | ICD-10-CM

## 2021-02-04 DIAGNOSIS — E66.01: ICD-10-CM

## 2021-02-04 DIAGNOSIS — L97.222: ICD-10-CM

## 2021-02-04 DIAGNOSIS — M62.3: ICD-10-CM

## 2021-02-04 DIAGNOSIS — I89.0: ICD-10-CM

## 2021-02-04 DIAGNOSIS — L97.122: ICD-10-CM

## 2021-02-04 DIAGNOSIS — M62.84: ICD-10-CM

## 2021-02-19 ENCOUNTER — HOSPITAL ENCOUNTER (OUTPATIENT)
Dept: HOSPITAL 75 - WOUNDCARE | Age: 56
End: 2021-02-19
Attending: ORTHOPAEDIC SURGERY
Payer: MEDICAID

## 2021-02-19 DIAGNOSIS — L97.122: ICD-10-CM

## 2021-02-19 DIAGNOSIS — L97.228: ICD-10-CM

## 2021-02-19 DIAGNOSIS — M62.3: ICD-10-CM

## 2021-02-19 DIAGNOSIS — I89.0: ICD-10-CM

## 2021-02-19 DIAGNOSIS — M62.84: ICD-10-CM

## 2021-02-19 DIAGNOSIS — I96: Primary | ICD-10-CM

## 2021-02-19 DIAGNOSIS — E66.01: ICD-10-CM

## 2021-03-12 ENCOUNTER — HOSPITAL ENCOUNTER (OUTPATIENT)
Dept: HOSPITAL 75 - WOUNDCARE | Age: 56
End: 2021-03-12
Attending: ORTHOPAEDIC SURGERY
Payer: MEDICAID

## 2021-03-12 DIAGNOSIS — M62.84: ICD-10-CM

## 2021-03-12 DIAGNOSIS — E66.01: ICD-10-CM

## 2021-03-12 DIAGNOSIS — I89.0: Primary | ICD-10-CM

## 2021-03-12 DIAGNOSIS — L97.121: ICD-10-CM

## 2021-03-12 DIAGNOSIS — M62.3: ICD-10-CM

## 2021-03-12 DIAGNOSIS — I96: ICD-10-CM

## 2021-03-26 ENCOUNTER — HOSPITAL ENCOUNTER (OUTPATIENT)
Dept: HOSPITAL 75 - WOUNDCARE | Age: 56
End: 2021-03-26
Attending: ORTHOPAEDIC SURGERY
Payer: MEDICAID

## 2021-03-26 DIAGNOSIS — M62.84: ICD-10-CM

## 2021-03-26 DIAGNOSIS — M62.3: ICD-10-CM

## 2021-03-26 DIAGNOSIS — L97.121: ICD-10-CM

## 2021-03-26 DIAGNOSIS — E66.01: ICD-10-CM

## 2021-03-26 DIAGNOSIS — I89.0: Primary | ICD-10-CM

## 2021-06-16 ENCOUNTER — HOSPITAL ENCOUNTER (INPATIENT)
Dept: HOSPITAL 75 - ER | Age: 56
LOS: 2 days | Discharge: NURSING, REHAB OR LONG-TERM CARE | DRG: 872 | End: 2021-06-18
Attending: FAMILY MEDICINE | Admitting: FAMILY MEDICINE
Payer: MEDICAID

## 2021-06-16 VITALS — SYSTOLIC BLOOD PRESSURE: 130 MMHG | DIASTOLIC BLOOD PRESSURE: 89 MMHG

## 2021-06-16 VITALS — HEIGHT: 65.75 IN | WEIGHT: 293 LBS | BODY MASS INDEX: 47.65 KG/M2

## 2021-06-16 VITALS — DIASTOLIC BLOOD PRESSURE: 70 MMHG | SYSTOLIC BLOOD PRESSURE: 121 MMHG

## 2021-06-16 VITALS — SYSTOLIC BLOOD PRESSURE: 127 MMHG | DIASTOLIC BLOOD PRESSURE: 66 MMHG

## 2021-06-16 VITALS — DIASTOLIC BLOOD PRESSURE: 75 MMHG | SYSTOLIC BLOOD PRESSURE: 129 MMHG

## 2021-06-16 DIAGNOSIS — N39.0: ICD-10-CM

## 2021-06-16 DIAGNOSIS — E11.622: ICD-10-CM

## 2021-06-16 DIAGNOSIS — N17.9: ICD-10-CM

## 2021-06-16 DIAGNOSIS — F17.210: ICD-10-CM

## 2021-06-16 DIAGNOSIS — K21.9: ICD-10-CM

## 2021-06-16 DIAGNOSIS — E11.40: ICD-10-CM

## 2021-06-16 DIAGNOSIS — Z79.01: ICD-10-CM

## 2021-06-16 DIAGNOSIS — Z86.718: ICD-10-CM

## 2021-06-16 DIAGNOSIS — E03.9: ICD-10-CM

## 2021-06-16 DIAGNOSIS — E66.01: ICD-10-CM

## 2021-06-16 DIAGNOSIS — L98.422: ICD-10-CM

## 2021-06-16 DIAGNOSIS — A41.9: Primary | ICD-10-CM

## 2021-06-16 DIAGNOSIS — Z79.84: ICD-10-CM

## 2021-06-16 DIAGNOSIS — R65.20: ICD-10-CM

## 2021-06-16 DIAGNOSIS — Z79.899: ICD-10-CM

## 2021-06-16 DIAGNOSIS — I89.0: ICD-10-CM

## 2021-06-16 DIAGNOSIS — L97.929: ICD-10-CM

## 2021-06-16 LAB
%HYPO/RBC NFR BLD AUTO: (no result) %
ALBUMIN SERPL-MCNC: 3.3 GM/DL (ref 3.2–4.5)
ALP SERPL-CCNC: 90 U/L (ref 40–136)
ALT SERPL-CCNC: 12 U/L (ref 0–55)
ANISOCYTOSIS BLD QL SMEAR: SLIGHT
APTT BLD: 55 SEC (ref 24–35)
APTT PPP: YELLOW S
BACTERIA #/AREA URNS HPF: (no result) /HPF
BASOPHILS # BLD AUTO: 0.1 10^3/UL (ref 0–0.1)
BASOPHILS NFR BLD AUTO: 0 % (ref 0–10)
BILIRUB SERPL-MCNC: 0.6 MG/DL (ref 0.1–1)
BILIRUB UR QL STRIP: NEGATIVE
BUN/CREAT SERPL: 14
CALCIUM SERPL-MCNC: 9.6 MG/DL (ref 8.5–10.1)
CHLORIDE SERPL-SCNC: 97 MMOL/L (ref 98–107)
CO2 SERPL-SCNC: 22 MMOL/L (ref 21–32)
CREAT SERPL-MCNC: 2.19 MG/DL (ref 0.6–1.3)
EOSINOPHIL # BLD AUTO: 0 10^3/UL (ref 0–0.3)
EOSINOPHIL NFR BLD AUTO: 0 % (ref 0–10)
EOSINOPHIL NFR BLD MANUAL: 1 %
FIBRINOGEN PPP-MCNC: (no result) MG/DL
GFR SERPLBLD BASED ON 1.73 SQ M-ARVRAT: 23 ML/MIN
GLUCOSE SERPL-MCNC: 107 MG/DL (ref 70–105)
GLUCOSE UR STRIP-MCNC: NEGATIVE MG/DL
HCT VFR BLD CALC: 34 % (ref 35–52)
HGB BLD-MCNC: 10.1 G/DL (ref 11.5–16)
INR PPP: 4.6 (ref 0.8–1.4)
KETONES UR QL STRIP: NEGATIVE
LEUKOCYTE ESTERASE UR QL STRIP: (no result)
LYMPHOCYTES # BLD AUTO: 1.2 10^3/UL (ref 1–4)
LYMPHOCYTES NFR BLD AUTO: 7 % (ref 12–44)
MANUAL DIFFERENTIAL PERFORMED BLD QL: YES
MCH RBC QN AUTO: 25 PG (ref 25–34)
MCHC RBC AUTO-ENTMCNC: 30 G/DL (ref 32–36)
MCV RBC AUTO: 82 FL (ref 80–99)
MICROCYTES BLD QL SMEAR: SLIGHT
MONOCYTES # BLD AUTO: 1.1 10^3/UL (ref 0–1)
MONOCYTES NFR BLD AUTO: 7 % (ref 0–12)
MONOCYTES NFR BLD: 2 %
NEUTROPHILS # BLD AUTO: 14.6 10^3/UL (ref 1.8–7.8)
NEUTROPHILS NFR BLD AUTO: 86 % (ref 42–75)
NEUTS BAND NFR BLD MANUAL: 83 %
NEUTS BAND NFR BLD: 4 %
NITRITE UR QL STRIP: NEGATIVE
PH UR STRIP: 6.5 [PH] (ref 5–9)
PLATELET # BLD: 530 10^3/UL (ref 130–400)
PMV BLD AUTO: 9.3 FL (ref 9–12.2)
POTASSIUM SERPL-SCNC: 3.1 MMOL/L (ref 3.6–5)
PROT SERPL-MCNC: 8.8 GM/DL (ref 6.4–8.2)
PROT UR QL STRIP: (no result)
PROTHROMBIN TIME: 43.3 SEC (ref 12.2–14.7)
RBC #/AREA URNS HPF: (no result) /HPF
SODIUM SERPL-SCNC: 137 MMOL/L (ref 135–145)
SP GR UR STRIP: 1.01 (ref 1.02–1.02)
SQUAMOUS #/AREA URNS HPF: (no result) /HPF
VARIANT LYMPHS NFR BLD MANUAL: 10 %
WBC # BLD AUTO: 17 10^3/UL (ref 4.3–11)
WBC #/AREA URNS HPF: (no result) /HPF

## 2021-06-16 PROCEDURE — 85007 BL SMEAR W/DIFF WBC COUNT: CPT

## 2021-06-16 PROCEDURE — 36410 VNPNXR 3YR/> PHY/QHP DX/THER: CPT

## 2021-06-16 PROCEDURE — 87077 CULTURE AEROBIC IDENTIFY: CPT

## 2021-06-16 PROCEDURE — 73552 X-RAY EXAM OF FEMUR 2/>: CPT

## 2021-06-16 PROCEDURE — 87040 BLOOD CULTURE FOR BACTERIA: CPT

## 2021-06-16 PROCEDURE — 85730 THROMBOPLASTIN TIME PARTIAL: CPT

## 2021-06-16 PROCEDURE — 36415 COLL VENOUS BLD VENIPUNCTURE: CPT

## 2021-06-16 PROCEDURE — 83605 ASSAY OF LACTIC ACID: CPT

## 2021-06-16 PROCEDURE — 85027 COMPLETE CBC AUTOMATED: CPT

## 2021-06-16 PROCEDURE — 80048 BASIC METABOLIC PNL TOTAL CA: CPT

## 2021-06-16 PROCEDURE — 85025 COMPLETE CBC W/AUTO DIFF WBC: CPT

## 2021-06-16 PROCEDURE — 81000 URINALYSIS NONAUTO W/SCOPE: CPT

## 2021-06-16 PROCEDURE — 71045 X-RAY EXAM CHEST 1 VIEW: CPT

## 2021-06-16 PROCEDURE — 87186 SC STD MICRODIL/AGAR DIL: CPT

## 2021-06-16 PROCEDURE — 82947 ASSAY GLUCOSE BLOOD QUANT: CPT

## 2021-06-16 PROCEDURE — 85610 PROTHROMBIN TIME: CPT

## 2021-06-16 PROCEDURE — 87088 URINE BACTERIA CULTURE: CPT

## 2021-06-16 PROCEDURE — 80053 COMPREHEN METABOLIC PANEL: CPT

## 2021-06-16 PROCEDURE — 76937 US GUIDE VASCULAR ACCESS: CPT

## 2021-06-16 PROCEDURE — 51702 INSERT TEMP BLADDER CATH: CPT

## 2021-06-16 RX ADMIN — SODIUM CHLORIDE, SODIUM LACTATE, POTASSIUM CHLORIDE, AND CALCIUM CHLORIDE SCH MLS/HR: 600; 310; 30; 20 INJECTION, SOLUTION INTRAVENOUS at 13:05

## 2021-06-16 RX ADMIN — INSULIN ASPART SCH UNIT: 100 INJECTION, SOLUTION INTRAVENOUS; SUBCUTANEOUS at 16:34

## 2021-06-16 RX ADMIN — SODIUM CHLORIDE, SODIUM LACTATE, POTASSIUM CHLORIDE, AND CALCIUM CHLORIDE SCH MLS/HR: 600; 310; 30; 20 INJECTION, SOLUTION INTRAVENOUS at 20:39

## 2021-06-16 RX ADMIN — FENTANYL CITRATE PRN MCG: 50 INJECTION, SOLUTION INTRAMUSCULAR; INTRAVENOUS at 17:26

## 2021-06-16 RX ADMIN — FENTANYL CITRATE PRN MCG: 50 INJECTION, SOLUTION INTRAMUSCULAR; INTRAVENOUS at 20:40

## 2021-06-16 RX ADMIN — HYDROCODONE BITARTRATE AND ACETAMINOPHEN PRN EA: 5; 325 TABLET ORAL at 23:18

## 2021-06-16 RX ADMIN — BACLOFEN SCH MG: 10 TABLET ORAL at 21:30

## 2021-06-16 RX ADMIN — SODIUM CHLORIDE SCH MLS/HR: 900 INJECTION, SOLUTION INTRAVENOUS at 17:00

## 2021-06-16 RX ADMIN — FENTANYL CITRATE PRN MCG: 50 INJECTION, SOLUTION INTRAMUSCULAR; INTRAVENOUS at 15:27

## 2021-06-16 RX ADMIN — INSULIN ASPART SCH UNIT: 100 INJECTION, SOLUTION INTRAVENOUS; SUBCUTANEOUS at 20:38

## 2021-06-16 RX ADMIN — FENTANYL CITRATE PRN MCG: 50 INJECTION, SOLUTION INTRAMUSCULAR; INTRAVENOUS at 23:18

## 2021-06-16 NOTE — OCCUPATIONAL THERAPY EVAL
OT Evaluation-General/PLF


Medical Diagnosis


Admission Date


Jun 16, 2021 at 11:47


Medical Diagnosis:  UTI


Onset Date:  Jun 13, 2021





Therapy Diagnosis


Therapy Diagnosis:  decreased ADL Status





Referral


Physician:  Ron


Referral Reason:  Evaluation/Treatment





Medical History


Pertinent Medical History:  DM, Hypothroidism, Neuropathy


Current History


ED due to confusion and fever





Social History


Home:  Nursing Home





ADL-Prior Level of Function


SCALE: Activities may be completed with or without assistive devices.





6-Indepedent-patient completes the activity by him/herself with no assistance 

from a helper.


5-Set-up or Clean-up Assistance-helper sets up or cleans up; patient completes 

activity. [place] assists only prior to or  


    following the activity.


4-Supervision or Touching Assistance-helper provides verbal cues and/or 

touching/steadying and/or contact guard assistance as patient completes 

activity. Assistance may be provided   


    throughout the activity or intermittently.


3-Partial/Moderate Assistance-helper does LESS THAN HALF the effort. [place] 

lifts, holds or supports trunk or limbs, but provides less than half the effort.


2-Substantial/Maximal Assistance-helper does MORE THAN HALF the effort. [place] 

lifts or holds trunk or limbs and provides more than half the effort.


1-Rpghnbrbw-lbranj does ALL the effort. Patient does none of the effort to 

complete the activity. Or, the assistance of 2 or more helpers is required for 

the patient to complete the  


    activity.


If activity was not attempted, code reason:


7-Patient Refused.


9-Not Applicable-not attempted and the patient did not perform the activity 

before the current illness, exacerbation or injury.


10-Not Attempted due to Environmental Limitations-(lack of equipment, weather 

restraints, etc.).


88-Not Attempted due to Medical Conditions or Safety Concerns.


ADL PLOF Comments


Pt required assistance with ADLs at OF, uses mechanical lift for transfers. 

She uses a BSC, has assistance with toileting, and assistance in shower. Pt 

indicates she is able to complete part of the shower, but requires assistance as

she is unable to reach her legs. She is primarily bed bound at NH


Self Care:  Needed Some Help





OT Current Status


Subjective


Pt laying in bed sleeping, difficulty waking pt up. Pt awoken to sternal rub, 

and had difficulty remaining awake with tx. Pt reports 10/10 pain in neck 

immediately upon wakening and repeatedly stated "oh shit" and "God damn it"





Mental Status/Objective


Attachments:  Ruffin Catheter, IV





Current


Upper Extremity ROM


WFL, RUE shoulder flexion to approx 120 degrees, LUE shoulder flexion to approx 

90 degrees


Upper Extremity Coordination


WFL





ADL-Treatment


Eating (QC):  6 (Based on clincial judgement, pt independent )


Lower Body Dressing (QC):  1 (based on clincial judgement and pt report.)


On/Off Footwear (QC):  1 (Based on clincial judgement, pt dependent with task)


Toileting Hygiene (QC):  1 (Per clincial judgement and pt report)





Other Treatments


Pt laying in bed, difficulty waking pt. Pt reports pain upon waking. OT educated

pt on purpose and benefit of OT. Pt attempted to provide information about PLOF,

had difficulty staying awake with conversation. OT encouraged pt to attempt 

sitting on side of bed, pt initially agreeable, but once sheet moved she 

refused. Pt declines any OOB and ADLs. Pt appears to be at PLOF, requiring 

mechanical lift for transfers, and requiring assistance with ADLs. Post tx, pt 

laying in bed, call light in reach and all needs met.





Education


OT Patient Education:  Correct positioning, Modified ADL techniques, Progress 

toward Goal/Update tx plan, Purpose of tx/functional activities, Rehab process


Teaching Recipient:  Patient


Teaching Methods:  Discussion





OT Long Term Goals


Long Term Goals


1=Demonstrate adherence to instructed precautions during ADL tasks.


2=Patient will verbalize/demonstrate understanding of assistive 

devices/modifications for ADL.


3=Patient will improve strength/tolerance for activity to enable patient to 

perform ADL's.





OT Education/Plan


Problem List/Assessment


Assessment:  No Skilled OT Needs ID'd


No skilled OT services indicated at this time, as pt is at PLOF and requires 

assistance with ADLs and a mechanical lift for transfers.





Discharge Recommendations


Plan/Recommendations:  Discharge/Goals Met





Treatment Plan/Plan of Care


Patient would benefit from OT for education, treatment and training to promote 

independence in ADL's, mobility, safety and/or upper extremity function for 

ADL's.


Plan of Care:  ADL Retraining


Treatment Duration:  Jun 16, 2021


Frequency:  1 time per week (eval only)





Time/GCodes


Start Time:  14:32


Stop Time:  14:40


Total Time Billed (hr/min):  8


Billed Treatment Time


1, SAM TOSCANO OT          Jun 16, 2021 14:55

## 2021-06-16 NOTE — HISTORY & PHYSICAL-HOSPITALIST
History of Present Illness


HPI/Chief Complaint


Pt is 56yoCF with a PMH of hypothyroidism, chronic pain, history of DVT, 

NIDDMII, and morbid obesity who presented to the ER today due to confusion and 

fever. She woke very briefly for more and complained of neck pain. I asked when 

there started and she said "When you moved me." I informed her I had not moved 

her and she then shut her eyes and went back to sleep. All other history is 

obtained from the records She reportedly began getting confused 3 days ago and 

refused to let anyone at the nursing home assist in her care or cleaning. She 

complained of right leg pain tot he Er. She was found to have a stool impaction 

and this by disimpacted by the ER physician revealing a cantaloupe size stool 

ball. She was also found to meet severe sepsis criteria and was admitted for IV 

abx for her UTI.


Source:  patient


Date Seen


21


Time Seen by a Provider:  14:00


Attending Physician


Robert Velasquez MD


PCP


Vikram Wolf DO


Referring Physician





Date of Admission


2021 at 11:47





Home Medications & Allergies


Home Medications


Reviewed patient Home Medication Reconciliation performed by pharmacy medication

reconciliations technician and/or nursing.


Patients Allergies have been reviewed.





Allergies





Allergies


Coded Allergies


  diphenhydramine (Verified Allergy, Unknown, 10/29/20)


    RESTLESS LEGSSD








Past Medical-Social-Family Hx


Patient Social History


Employed/Student:  unemployed


Smoking Status:  Current Someday Smoker





Immunizations Up To Date


Date of Influenza Vaccine:  Oct 1, 2020


Tetanus Booster (TDap):  Unknown


Date of Pneumonia Vaccine:  Oct 1, 2019





Seasonal Allergies


Seasonal Allergies:  No





Current Status


Primary Language:  English





Past Medical History


Surgeries:  Appendectomy,  Section, Gallbladder, Orthopedic


Sleep Apnea


Neuropathy


Gastroesophageal Reflux


Fibromyalgia


Diabetes, Non-Insulin dep


Recent Skin Changes


Blood Disorders:  No





Family Medical History


Reviewed Nursing Family Hx


No Pertinent Family Hx





Review of Systems


ROS-Unable to Obtain:  patient did not participate, see HPI


Constitutional:  see HPI





Physical Exam


Physical Exam


Vital Signs





Vital Signs - First Documented








 21





 09:12 09:51


 


Temp  36.7


 


Pulse  99


 


Resp  18


 


B/P (MAP)  107/68 (81)


 


Pulse Ox 94 


 


O2 Delivery Nasal Cannula 


 


O2 Flow Rate 2.00 


 


FiO2 94 





Capillary Refill : Less Than 3 Seconds


Height, Weight, BMI


Height: '"


Weight: lbs. oz. kg; 70.00 BMI


Method:


General Appearance:  No Apparent Distress, Chronically ill, Obese


HEENT:  PERRL/EOMI, Moist Mucous Membranes; No Scleral Icterus (L), No Scleral 

Icterus (R)


Neck:  Normal Inspection, Supple


Respiratory:  Lungs Clear, No Respiratory Distress


Cardiovascular:  Regular Rate, Rhythm, No Murmur


Gastrointestinal:  Normal Bowel Sounds, Non Tender, Soft


Extremity:  Pedal Edema, Swelling, Other (left leg with dressing in place)


Neurologic/Psychiatric:  Alert, Disoriented


Skin:  Normal Color, Warm/Dry





Results


Results/Procedures


Labs


Laboratory Tests


21 09:38








Patient resulted labs reviewed.


Imaging:  Reviewed Imaging Report


Imaging


                 [facility] [place], [place]





NAME:   DUANE WARD


MED REC#:   [id number]


ACCOUNT#:   [id number]


PT STATUS:   REG ER


:   [id number]


PHYSICIAN:   SHE COBIAN MD


ADMIT DATE:   21/ER


                                   ***Draft***


Date of Exam:21





CHEST 1 VIEW, AP/PA ONLY








Indication: Sepsis.





Time of exam 10:05 AM





Correlation is made with prior chest from 2020.





The heart is enlarged. Lungs appear to be clear. No definite


infiltrate or failure seen. There is no  effusion or


pneumothorax.





IMPRESSION: Cardiomegaly. No other significant abnormality is


detected.





  Dictated on workstation # [id number]








Dict:   21 1012


Trans:   21 1015


[facility] [id number]





Interpreted by:     JENNA VALENCIA MD


Electronically signed by:





Assessment/Plan


Admission Diagnosis


Severe Sepsis


Admission Status:  Inpatient Order (span 2 midnights)


Reason for Inpatient Admission:  


see below





Assessment and Plan


Severe Sepsis


UTI


ERIC


   Leukocytosis with tachycardia and reported fever, lactic acidosis


   UA consistent with UTI


   Previously has grown somewhat resistent proteus on culture from 2020


   Continue Zosyn


   Await cultures


   Creatinine 2.19 up from 1.12 in December





Lymphedema


Morbid Obesity


Sacral wound


   Wound care consulted, appreciate recs


   Does not appear to have cellulitis as well


   PT/OT





History of DVT


   INR supratherapeutic


   Check in AM


   Hold home warfarin





Hypothyroidism


   Continue home med





DVT ppx: Supratherapeutic INR





Diagnosis/Problems


Diagnosis/Problems





(1) Sepsis


Status:  Acute


Qualifiers:  


   Sepsis type:  sepsis due to unspecified organism  Sepsis acute organ 

dysfunction status:  with acute organ dysfunction  Severe sepsis acute organ 

dysfunction type:  acute renal failure  Acute renal failure type:  unspecified  

Severe sepsis shock status:  without septic shock  Qualified Codes:  A41.9 - 

Sepsis, unspecified organism; R65.20 - Severe sepsis without septic shock; N17.9

- Acute kidney failure, unspecified


(2) Sacral ulcer


Status:  Acute


Qualifiers:  


   Non-pressure ulcer stage:  with fat layer exposed  Qualified Codes:  L98.422 

- Non-pressure chronic ulcer of back with fat layer exposed


(3) Supratherapeutic INR


Status:  Acute


(4) Urinary tract infection


Status:  Acute


Qualifiers:  


   Urinary tract infection type:  acute cystitis  Hematuria presence:  without 

hematuria  Qualified Codes:  N30.00 - Acute cystitis without hematuria


(5) Bedbound


Status:  Acute


(6) Morbid obesity


(7) ERIC (acute kidney injury)


(8) Long term current use of anticoagulants with INR goal of 2.0-3.0


(9) Ulcer of left lower extremity


Status:  Acute











ROBERT VELASQUEZ MD              2021 14:05

## 2021-06-16 NOTE — PHYSICAL THERAPY EVALUATION
PT Evaluation-General


Medical Diagnosis


Admission Date


2021 at 11:47


Medical Diagnosis:  urosepsis, AMS


Onset Date:  2021





Therapy Diagnosis


Therapy Diagnosis:  impaired mobility, strength





Referral


Physician:  Ron


Reason for Referral:  Evaluation/Treatment





Medical History


Pertinent Medical History:  DM, Hypothroidism, Neuropathy


Additional Medical History


Past Medical History


Surgeries:  Appendectomy,  Section, Gallbladder, Orthopedic


Sleep Apnea


Neuropathy


Gastroesophageal Reflux


Fibromyalgia


Diabetes, Non-Insulin dep


Recent Skin Changes


Reviewed History:  Yes





Social History


Home:  Nursing Home





Prior


Prior Level of Function


SCALE: Activities may be completed with or without assistive devices.





6-Indepedent-patient completes the activity by him/herself with no assistance 

from a helper.


5-Set-up or Clean-up Assistance-helper sets up or cleans up; patient completes 

activity. Glenn assists only prior to or  


    following the activity.


4-Supervision or Touching Assistance-helper provides verbal cues and/or 

touching/steadying and/or contact guard assistance as patient completes 

activity. Assistance may be provided   


    throughout the activity or intermittently.


3-Partial/Moderate Assistance-helper does LESS THAN HALF the effort. Glenn 

lifts, holds or supports trunk or limbs, but provides less than half the effort.


2-Substantial/Maximal Assistance-helper does MORE THAN HALF the effort. Glenn 

lifts or holds trunk or limbs and provides more than half the effort.


2-Akvutaglx-pkhvso does ALL the effort. Patient does none of the effort to 

complete the activity. Or, the assistance of 2 or more helpers is required for 

the patient to complete the  


    activity.


If activity was not attempted, code reason:


7-Patient Refused.


9-Not Applicable-not attempted and the patient did not perform the activity 

before the current illness, exacerbation or injury.


10-Not Attempted due to Environmental Limitations-(lack of equipment, weather 

restraints, etc.).


88-Not Attempted due to Medical Conditions or Safety Concerns.


Bed Mobility:  1


Transfers (B,C,W/C):  1


Indoor Mobility (Ambulation):  Not Applicalbe


Stairs:  Not Applicalbe


Prior Devices Use:  Mechanical lift


Patient is a nursing home resident.  She says she only gets out of bed via mary

to a bedside commode.  No sitting or standing.





PT Evaluation-Current


Subjective


Patient in bed pre tx, she cannot be roused without a sternal rub.  After waking

she yells out that her neck hurts over an over, cursing and moaning.  Patient 

initially agrees to participate in physical therapy but then says she doesn't 

want to perform LE exercises and refused to try to sit on the side of the bed.  

Patient drifts in and out of sleep and has to be roused many times.





Pt/Family Goals


none stated





Objective


Patient Orientation:  Person, Situation


Attachments:  Ruffin Catheter





ROM/Strength


ROM Lower Extremities


limited due to obesity and non-participation, patient is observed moving her 

ankles, she has a collapsed arch on the right side.


Strength Lower Extremities


not tested, patient refuses





Sensory


Vision:  Functional


Hearing:  Functional





Assessment/Needs


Patient has impaired mobility, strength.  Patient in bed post tx with nurse 

call, phone, tray, all needs met.  Patient refuses to perform LE exercises or 

try to sit on the side of the bed.  Patient states she only gets out of bed via 

mary lift.  Patient states she does perform LE exercises at the nursing home 

and is advised to continue doing them, patient agrees and states she knows which

ones to do.  Patient seems to be at previous level of functional mobility which 

is dependent with transfers via mary lift.


Rehab Potential:  Poor





PT Long Term Goals


Long Term Goals


PT Long Term Goals Time Frame:  2021





PT Plan


Treatment/Plan


Treatment Plan:  Discontinue PT


Treatment Duration:  2021


Frequency:  





Safety Risks/Education


Patient Education:  Correct Positioning, Safety Issues


Teaching Recipient:  Patient


Teaching Methods:  Discussion


Response to Teaching:  Verbalize Understanding





Discharge Recommendations


Plan


DC


Therapy Discharge Recommendati:  24 Hour Supervision





Time/GCodes


Time In:  1432


Time Out:  1440


Total Billed Treatment Time:  8


Total Billed Treatment


1 visit


JOY HERNANDEZ PT                2021 14:50

## 2021-06-16 NOTE — ED GENERAL
General


Chief Complaint:  Altered Mental Status


Stated Complaint:  AMS


Source of Information:  Patient


Exam Limitations:  No Limitations





History of Present Illness


Date Seen by Provider:  2021


Time Seen by Provider:  09:11


Initial Comments


Here with report of altered mental status and not participating well in the care

at the [facility] that she is a resident of.  Patient is morbidly 

obese with history of right femur fracture that has been repaired and sacral 

wounds that is currently under therapy.  Patient is complaining of right leg 

pain.  No report of falls.  She is also reporting pain with bowel movement but 

has not had a bowel movement in a while due to chronic narcotic use.  She has 

not allowed the nursing care at the [facility] to help her clean.  

Does have low-grade temperature.  Does have history of urinary tract infections 

with similar presentation of altered mental status.  She is not hypotensive.  

She does have fentanyl patch on currently.


Timing/Duration:  2-3 Days, Getting Worse


Severity:  Moderate


Modifying Factors:  worse with Movement


Associated Systoms:  No Cough; Fever/Chills; No Nausea/Vomiting, No Shortness of

Air, No Weakness





Allergies and Home Medications


Allergies


Coded Allergies:  


     diphenhydramine (Verified  Allergy, Unknown, 10/29/20)


   RESTLESS LEGSSD





Home Medications


Acetaminophen 325 Mg Tablet, 650 MG PO Q4H PRN for PAIN-MILD (1-4), (Reported)


Allopurinol 100 Mg Tablet, 100 MG PO BID, (Reported)


Baclofen 10 Mg Tablet, 10 MG PO QID, (Reported)


Diclofenac Sodium 100 Gm Gel..gram., 1 APPLIC TD Q6H PRN for PAIN-BREAKTHROUGH, 

(Reported)


   APPLY 4 GRAMS TO EACH KNEE NOT TO EXCEED 32 GRAMS PER DAY 


Duloxetine HCl 30 Mg Capsule.dr, 30 MG PO DAILY, (Reported)


Furosemide 40 Mg Tablet, 40 MG PO DAILY, (Reported)


Ibuprofen 200 Mg Capsule, 200 MG PO Q6H PRN for PAIN-MILD (1-4), (Reported)


Levothyroxine Sodium 50 Mcg Tablet, 50 MCG PO DAILY, (Reported)


Metformin HCl 500 Mg Tablet, 500 MG PO DAILY, (Reported)


Metolazone 2.5 Mg Tablet, 2.5 MG PO DAILY, (Reported)


Nystatin 15 Gm Oint...g., 1 APPLIC TP BID, (Reported)


Oxycodone HCl 15 Mg Tablet, 15 MG PO Q6H PRN for PAIN-SEVERE (8-10), (Reported)


Pantoprazole Sodium 40 Mg Tablet.dr, 40 MG PO DAILY, (Reported)


Pramipexole Di-HCl 1 Mg Tablet, 1 MG PO 0900,1200,1700, (Reported)


Silver Sulfadiazine 50 Gm Cream..g., 1 APPLIC TOP BID, (Reported)


   APPLY TO BILATERAL ANKLE/LEFT SHIN ULCER 


Warfarin Sodium 10 Mg Tablet, 10 MG PO WILDER,MO,TU,WE,FR,SA, (Reported)


   TAKES AT 1700 ON SUN,MON,TUE,WEF,FRI,SAT 


Warfarin Sodium 10 Mg Tablet, 5 MG PO THUR, (Reported)


   TAKES  OF 10MG TAB AT 1700 





Patient Home Medication List


Home Medication List Reviewed:  Yes





Review of Systems


Review of Systems


Constitutional:  see HPI; No chills; fever, weakness


EENTM:  No nose congestion, No throat pain


Respiratory:  No cough, No short of breath


Cardiovascular:  No chest pain; edema


Gastrointestinal:  No abdominal pain; constipation; No nausea, No vomiting


Genitourinary:  see HPI, other (Foul-smelling urine)


Musculoskeletal:  back pain, joint pain, muscle pain


Skin:  change in color, lesions


Psychiatric/Neurological:  Denies Headache, Denies Weakness





All Other Systems Reviewed


Negative Unless Noted:  Yes





Past Medical-Social-Family Hx


Past Med/Social Hx:  Reviewed Nursing Past Med/Soc Hx


Patient Social History


Alcohol Use:  Denies Use


Smoking Status:  Current Someday Smoker


Type Used:  Cigarettes


2nd Hand Smoke Exposure:  No


Recent Hopitalizations:  No





Immunizations Up To Date


Tetanus Booster (TDap):  Unknown


Date of Pneumonia Vaccine:  Oct 1, 2019


Date of Influenza Vaccine:  Oct 1, 2020





Seasonal Allergies


Seasonal Allergies:  No





Past Medical History


Surgeries:  Yes


Appendectomy,  Section, Gallbladder, Orthopedic


Respiratory:  Yes


Sleep Apnea


Cardiac:  No


Neurological:  No


Neuropathy


Genitourinary:  No


Gastrointestinal:  Yes


Gastroesophageal Reflux


Musculoskeletal:  Yes


Fibromyalgia


Endocrine:  No


Diabetes, Non-Insulin dep


HEENT:  No


Cancer:  No


Psychosocial:  No


Integumentary:  Yes


Recent Skin Changes


Blood Disorders:  No





Family Medical History


Reviewed Nursing Family Hx





Physical Exam-Suspected Sepsis


Physical Exam


Vital Signs





Vital Signs - First Documented








 21





 09:12 09:51


 


Temp  36.7


 


Pulse  99


 


Resp  18


 


B/P (MAP)  107/68 (81)


 


Pulse Ox 94 


 


O2 Delivery Nasal Cannula 


 


O2 Flow Rate 2.00 


 


FiO2 94 





Capillary Refill :


Height, Weight, BMI


Height: '"


Weight: lbs. oz. kg; 68.00 BMI


Method:


General Appearance:  Moderate Distress, Obese


HEENT:  PERRL/EOMI, Pharynx Normal


Neck:  Non Tender, Supple


Respiratory:  Lungs Clear, Normal Breath Sounds


Cardiovascular:  No Murmur, Tachycardia


Gastrointestinal:  Non Tender, Soft, Other (Morbidly obese)


Rectal:  Other (Large stool ball noted.  Digital rectal disimpaction performed 

and cantaloupe-sized stool ball ultimately obtained.  Does have small amount of 

gross blood noted within the stool and hemorrhoids/irritation noted in the re

ctal verge/anus.)


Back:  Normal Inspection, No CVA Tenderness, No Vertebral Tenderness


Extremity:  Normal Range of Motion, Non Tender


Neurologic/Psychiatric:  Alert, Oriented x3


Skin:  normal color, warm/dry, other (2 x 2 centimeter sacral ulcer noted with 

some surrounding erythema.)





Focused Exam


Lactate Level


21 09:38: Lactic Acid Level 2.54*H


21 11:35: Lactic Acid Level 1.73





Lactic Acid Level





Laboratory Tests








Test


 21


09:38 21


11:35


 


Lactic Acid Level


 2.54 MMOL/L


(0.50-2.00)  *H 1.73 MMOL/L


(0.50-2.00)











Progress/Results/Core Measures


Suspected Sepsis


SIRS


Temperature: 


Pulse:  


Respiratory Rate: 


 


Laboratory Tests


21 09:38: White Blood Count 17.0H


Blood Pressure  / 


Mean: 


 


21 09:38: Lactic Acid Level 2.54*H


21 11:35: Lactic Acid Level 1.73


Laboratory Tests


21 09:38: 


Creatinine 2.19H, INR Comment 4.6H, Platelet Count 530H, Total Bilirubin 0.6








Results/Orders


Lab Results





Laboratory Tests








Test


 21


09:35 21


09:38 21


11:35 Range/Units


 


 


Urine Color YELLOW     


 


Urine Clarity CLOUDY     


 


Urine pH 6.5    5-9  


 


Urine Specific Gravity 1.015 L   1.016-1.022  


 


Urine Protein TRACE H   NEGATIVE  


 


Urine Glucose (UA) NEGATIVE    NEGATIVE  


 


Urine Ketones NEGATIVE    NEGATIVE  


 


Urine Nitrite NEGATIVE    NEGATIVE  


 


Urine Bilirubin NEGATIVE    NEGATIVE  


 


Urine Urobilinogen 0.2    < = 1.0  MG/DL


 


Urine Leukocyte Esterase 3+ H   NEGATIVE  


 


Urine RBC (Auto) 2+ H   NEGATIVE  


 


Urine RBC 5-10 H    /HPF


 


Urine WBC TNTC H    /HPF


 


Urine Squamous Epithelial


Cells 2-5 


 


 


  /HPF





 


Urine Crystals NONE     /LPF


 


Urine Bacteria LARGE H    /HPF


 


Urine Casts NONE     /LPF


 


Urine Mucus NEGATIVE     /LPF


 


Urine Culture Indicated YES     


 


White Blood Count


 


 17.0 H


 


 4.3-11.0


10^3/uL


 


Red Blood Count


 


 4.13 


 


 3.80-5.11


10^6/uL


 


Hemoglobin  10.1 L  11.5-16.0  g/dL


 


Hematocrit  34 L  35-52  %


 


Mean Corpuscular Volume  82   80-99  fL


 


Mean Corpuscular Hemoglobin  25   25-34  pg


 


Mean Corpuscular Hemoglobin


Concent 


 30 L


 


 32-36  g/dL





 


Red Cell Distribution Width  18.7 H  10.0-14.5  %


 


Platelet Count


 


 530 H


 


 130-400


10^3/uL


 


Mean Platelet Volume  9.3   9.0-12.2  fL


 


Immature Granulocyte % (Auto)  1    %


 


Neutrophils (%) (Auto)  86 H  42-75  %


 


Lymphocytes (%) (Auto)  7 L  12-44  %


 


Monocytes (%) (Auto)  7   0-12  %


 


Eosinophils (%) (Auto)  0   0-10  %


 


Basophils (%) (Auto)  0   0-10  %


 


Neutrophils # (Auto)


 


 14.6 H


 


 1.8-7.8


10^3/uL


 


Lymphocytes # (Auto)


 


 1.2 


 


 1.0-4.0


10^3/uL


 


Monocytes # (Auto)


 


 1.1 H


 


 0.0-1.0


10^3/uL


 


Eosinophils # (Auto)


 


 0.0 


 


 0.0-0.3


10^3/uL


 


Basophils # (Auto)


 


 0.1 


 


 0.0-0.1


10^3/uL


 


Immature Granulocyte # (Auto)


 


 0.1 


 


 0.0-0.1


10^3/uL


 


Neutrophils % (Manual)  83    %


 


Lymphocytes % (Manual)  10    %


 


Monocytes % (Manual)  2    %


 


Eosinophils % (Manual)  1    %


 


Band Neutrophils  4    %


 


Hypochromasia  MODERATE    


 


Anisocytosis  SLIGHT    


 


Microcytosis  SLIGHT    


 


Prothrombin Time  43.3 H  12.2-14.7  SEC


 


INR Comment  4.6 H  0.8-1.4  


 


Activated Partial


Thromboplast Time 


 55 H


 


 24-35  SEC





 


Sodium Level  137   135-145  MMOL/L


 


Potassium Level  3.1 L  3.6-5.0  MMOL/L


 


Chloride Level  97 L    MMOL/L


 


Carbon Dioxide Level  22   21-32  MMOL/L


 


Anion Gap  18 H  5-14  MMOL/L


 


Blood Urea Nitrogen  30 H  7-18  MG/DL


 


Creatinine


 


 2.19 H


 


 0.60-1.30


MG/DL


 


Estimat Glomerular Filtration


Rate 


 23 


 


  





 


BUN/Creatinine Ratio  14    


 


Glucose Level  107 H    MG/DL


 


Lactic Acid Level


 


 2.54 *H


 1.73 


 0.50-2.00


MMOL/L


 


Calcium Level  9.6   8.5-10.1  MG/DL


 


Corrected Calcium  10.2 H  8.5-10.1  MG/DL


 


Total Bilirubin  0.6   0.1-1.0  MG/DL


 


Aspartate Amino Transf


(AST/SGOT) 


 28 


 


 5-34  U/L





 


Alanine Aminotransferase


(ALT/SGPT) 


 12 


 


 0-55  U/L





 


Alkaline Phosphatase  90     U/L


 


Total Protein  8.8 H  6.4-8.2  GM/DL


 


Albumin  3.3   3.2-4.5  GM/DL








My Orders





Orders - SHE COBIAN MD


Cbc With Automated Diff (21 09:10)


Comprehensive Metabolic Panel (21 09:10)


Blood Culture (21 09:10)


Sputum Culture (21 09:10)


Urinalysis (21 09:10)


Urine Culture (21 09:10)


Protime With Inr (21 09:10)


Partial Thromboplastin Time (21 09:10)


Chest 1 View, Ap/Pa Only (21 09:10)


Ed Iv/Invasive Line Start (21 09:10)


Vital Signs Adult Sepsis Patie Q15M (21 09:10)


O2 (21 09:10)


Remove Rings In Anticipation O (21 09:10)


Lactic Acid Analyzer (21 09:10)


Lactated Ringers (Lr 1000 Ml Iv Solution (21 09:10)


Piperacillin Sodium/Tazobactam (Zosyn Vi (21 09:45)


Fentanyl  Inj (Sublimaze Injection) (21 09:31)


Manual Differential (21 09:38)





Medications Given in ED





Current Medications








 Medications  Dose


 Ordered  Sig/Mildred


 Route  Start Time


 Stop Time Status Last Admin


Dose Admin


 


 Piperacillin Sod/


 Tazobactam Sod


 4.5 gm/Sodium


 Chloride  100 ml @ 


 200 mls/hr  ONCE  ONCE


 IV  21 09:45


 21 10:14 DC 21 10:51


200 MLS/HR








Vital Signs/I&O











 21





 09:12 09:51 11:55


 


Temp  36.7 


 


Pulse  99 88


 


Resp  18 18


 


B/P (MAP)  107/68 (81) 131/81


 


Pulse Ox 94 94 95


 


O2 Delivery Nasal Cannula Nasal Cannula Nasal Cannula


 


O2 Flow Rate 2.00 2.00 2.00


 


FiO2 94  





Capillary Refill :


Progress Note :  


Progress Note


Seen and evaluated.  Sepsis protocol initiated.  I did assist nursing with 

rolling the patient and ultimately perform digital rectal disimpaction after 

partial stool ball noted and then large stool ball obtained.  We were able to 

place Ruffin catheter at that time.  Patient is morbidly obese and does have 

sacral wound.  LR 1 L bolus ordered.  Zosyn 4.5 g IV ordered after blood 

cultures complete due to cloudy, foul-smelling urine and concerns for sepsis.  

Monitor patient. 1105: I did discuss the case with Dr. Velasquez. Patient does have 

rather remarkable urinary tract infection with mild elevation of lactic acid and

moderate elevation of white count. Concerns for multidrug-resistant organism. 

Patient has appropriate blood pressure. She is appropriate for [facility] 

admission. Dr. Velasquez accepts patient inpatient status.. Patient agrees to plan.





Diagnostic Imaging





   Diagonstic Imaging:  Xray


   Plain Films/CT/US/NM/MRI:  chest


Comments


                 [facility].


                                [place], [place]





NAME:   DUANE WARD


MED REC#:   [id number]


ACCOUNT#:   [id number]


PT STATUS:   REG ER


:   1965


PHYSICIAN:   SHE COBIAN MD


ADMIT DATE:   21/ER


                                   ***Draft***


Date of Exam:21





CHEST 1 VIEW, AP/PA ONLY








Indication: Sepsis.





Time of exam 10:05 AM





Correlation is made with prior chest from 2020.





The heart is enlarged. Lungs appear to be clear. No definite


infiltrate or failure seen. There is no  effusion or


pneumothorax.





IMPRESSION: Cardiomegaly. No other significant abnormality is


detected.





  Dictated on workstation # [id number]








Dict:   21 1012


Trans:   21 1015


[facility] [id number]





Interpreted by:     JENNA VALENCIA MD


Electronically signed by:





Departure


Communication (Admissions)


Time/Spoke to Admitting Phy:  11:05





Impression





   Primary Impression:  


   Sepsis


   Qualified Codes:  A41.9 - Sepsis, unspecified organism


   Additional Impressions:  


   Urinary tract infection


   Qualified Codes:  N30.00 - Acute cystitis without hematuria


   Sacral ulcer


   Qualified Codes:  L98.422 - Non-pressure chronic ulcer of back with fat layer

   exposed


   Supratherapeutic INR


Disposition:   ADMITTED AS INPATIENT


Condition:  Stable





Admissions


Decision to Admit Reason:  Admit from ER (General)


Decision to Admit/Date:  2021


Time/Decision to Admit Time:  11:05





Departure-Patient Inst.


Referrals:  


NYLA VILLAFANA DO (PCP/Family)


Primary Care Physician











SHE COBIAN MD          2021 10:02

## 2021-06-16 NOTE — DIAGNOSTIC IMAGING REPORT
Indication: Sepsis.



Time of exam 10:05 AM



Correlation is made with prior chest from 12/17/2020.



The heart is enlarged. Lungs appear to be clear. No definite

infiltrate or failure seen. There is no  effusion or

pneumothorax.



IMPRESSION: Cardiomegaly. No other significant abnormality is

detected.



Dictated by: 



  Dictated on workstation # [id number]

## 2021-06-17 VITALS — SYSTOLIC BLOOD PRESSURE: 127 MMHG | DIASTOLIC BLOOD PRESSURE: 93 MMHG

## 2021-06-17 VITALS — DIASTOLIC BLOOD PRESSURE: 73 MMHG | SYSTOLIC BLOOD PRESSURE: 126 MMHG

## 2021-06-17 VITALS — DIASTOLIC BLOOD PRESSURE: 82 MMHG | SYSTOLIC BLOOD PRESSURE: 127 MMHG

## 2021-06-17 VITALS — SYSTOLIC BLOOD PRESSURE: 128 MMHG | DIASTOLIC BLOOD PRESSURE: 68 MMHG

## 2021-06-17 VITALS — SYSTOLIC BLOOD PRESSURE: 116 MMHG | DIASTOLIC BLOOD PRESSURE: 50 MMHG

## 2021-06-17 LAB
BASOPHILS # BLD AUTO: 0 10^3/UL (ref 0–0.1)
BASOPHILS NFR BLD AUTO: 0 % (ref 0–10)
BUN/CREAT SERPL: 17
CALCIUM SERPL-MCNC: 9 MG/DL (ref 8.5–10.1)
CHLORIDE SERPL-SCNC: 96 MMOL/L (ref 98–107)
CO2 SERPL-SCNC: 25 MMOL/L (ref 21–32)
CREAT SERPL-MCNC: 2.12 MG/DL (ref 0.6–1.3)
EOSINOPHIL # BLD AUTO: 0.4 10^3/UL (ref 0–0.3)
EOSINOPHIL NFR BLD AUTO: 3 % (ref 0–10)
GFR SERPLBLD BASED ON 1.73 SQ M-ARVRAT: 24 ML/MIN
GLUCOSE SERPL-MCNC: 102 MG/DL (ref 70–105)
HCT VFR BLD CALC: 28 % (ref 35–52)
HGB BLD-MCNC: 8.3 G/DL (ref 11.5–16)
INR PPP: 4.9 (ref 0.8–1.4)
LYMPHOCYTES # BLD AUTO: 0.9 10^3/UL (ref 1–4)
LYMPHOCYTES NFR BLD AUTO: 7 % (ref 12–44)
MANUAL DIFFERENTIAL PERFORMED BLD QL: NO
MCH RBC QN AUTO: 24 PG (ref 25–34)
MCHC RBC AUTO-ENTMCNC: 30 G/DL (ref 32–36)
MCV RBC AUTO: 81 FL (ref 80–99)
MONOCYTES # BLD AUTO: 0.8 10^3/UL (ref 0–1)
MONOCYTES NFR BLD AUTO: 7 % (ref 0–12)
NEUTROPHILS # BLD AUTO: 9.6 10^3/UL (ref 1.8–7.8)
NEUTROPHILS NFR BLD AUTO: 82 % (ref 42–75)
PLATELET # BLD: 381 10^3/UL (ref 130–400)
PMV BLD AUTO: 9.2 FL (ref 9–12.2)
POTASSIUM SERPL-SCNC: 2.8 MMOL/L (ref 3.6–5)
PROTHROMBIN TIME: 46 SEC (ref 12.2–14.7)
SODIUM SERPL-SCNC: 135 MMOL/L (ref 135–145)
WBC # BLD AUTO: 11.7 10^3/UL (ref 4.3–11)

## 2021-06-17 RX ADMIN — FENTANYL CITRATE PRN MCG: 50 INJECTION, SOLUTION INTRAMUSCULAR; INTRAVENOUS at 04:00

## 2021-06-17 RX ADMIN — FENTANYL CITRATE PRN MCG: 50 INJECTION, SOLUTION INTRAMUSCULAR; INTRAVENOUS at 16:29

## 2021-06-17 RX ADMIN — INSULIN ASPART SCH UNIT: 100 INJECTION, SOLUTION INTRAVENOUS; SUBCUTANEOUS at 10:29

## 2021-06-17 RX ADMIN — INSULIN ASPART SCH UNIT: 100 INJECTION, SOLUTION INTRAVENOUS; SUBCUTANEOUS at 21:40

## 2021-06-17 RX ADMIN — SODIUM CHLORIDE SCH MLS/HR: 900 INJECTION, SOLUTION INTRAVENOUS at 01:12

## 2021-06-17 RX ADMIN — VENLAFAXINE HYDROCHLORIDE SCH MG: 75 CAPSULE, EXTENDED RELEASE ORAL at 16:28

## 2021-06-17 RX ADMIN — PRAMIPEXOLE DIHYDROCHLORIDE SCH MG: 0.5 TABLET ORAL at 16:28

## 2021-06-17 RX ADMIN — INSULIN ASPART SCH UNIT: 100 INJECTION, SOLUTION INTRAVENOUS; SUBCUTANEOUS at 15:43

## 2021-06-17 RX ADMIN — HYDROCODONE BITARTRATE AND ACETAMINOPHEN PRN EA: 5; 325 TABLET ORAL at 08:22

## 2021-06-17 RX ADMIN — SODIUM CHLORIDE, SODIUM LACTATE, POTASSIUM CHLORIDE, AND CALCIUM CHLORIDE SCH MLS/HR: 600; 310; 30; 20 INJECTION, SOLUTION INTRAVENOUS at 01:12

## 2021-06-17 RX ADMIN — PRAMIPEXOLE DIHYDROCHLORIDE SCH MG: 0.5 TABLET ORAL at 11:56

## 2021-06-17 RX ADMIN — FENTANYL CITRATE PRN MCG: 50 INJECTION, SOLUTION INTRAMUSCULAR; INTRAVENOUS at 08:23

## 2021-06-17 RX ADMIN — SODIUM CHLORIDE SCH MLS/HR: 900 INJECTION, SOLUTION INTRAVENOUS at 08:22

## 2021-06-17 RX ADMIN — ALLOPURINOL SCH MG: 100 TABLET ORAL at 16:28

## 2021-06-17 RX ADMIN — SODIUM CHLORIDE SCH MLS/HR: 900 INJECTION, SOLUTION INTRAVENOUS at 16:28

## 2021-06-17 RX ADMIN — HYDROCODONE BITARTRATE AND ACETAMINOPHEN PRN EA: 5; 325 TABLET ORAL at 04:00

## 2021-06-17 RX ADMIN — BACLOFEN SCH MG: 10 TABLET ORAL at 08:22

## 2021-06-17 RX ADMIN — BACLOFEN SCH MG: 10 TABLET ORAL at 12:00

## 2021-06-17 RX ADMIN — INSULIN ASPART SCH UNIT: 100 INJECTION, SOLUTION INTRAVENOUS; SUBCUTANEOUS at 06:21

## 2021-06-17 RX ADMIN — BACLOFEN SCH MG: 10 TABLET ORAL at 16:28

## 2021-06-17 RX ADMIN — FENTANYL CITRATE PRN MCG: 50 INJECTION, SOLUTION INTRAMUSCULAR; INTRAVENOUS at 18:41

## 2021-06-17 RX ADMIN — BACLOFEN SCH MG: 10 TABLET ORAL at 21:50

## 2021-06-17 NOTE — WOUND CARE ASSESSMENT
Wound Care Assessment


Date Seen by Provider:  Jun 17, 2021


Time Seen by Provider:  17:50


Chief Complaint


Sacral ulcer.


HPI


The patient is a 56 year old female with reportedly a sacral pressure ulcer.   

She refuses examination.  Will sign off.


Past Medical History:  Admits Diabetes Type II


Smoking Status:  Former Smoker


Recreational Drug Use:  No


Alcohol Use:  Denies Use


Exam





Vital Signs








  Date Time  Temp Pulse Resp B/P (MAP) Pulse Ox O2 Delivery O2 Flow Rate FiO2


 


6/17/21 16:10 35.6 79 16 127/93 (104) 94 Room Air  


 


6/17/21 11:50       4.50 


 


6/16/21 09:12        94





Capillary Refill : Less Than 3 Seconds





Results


Laboratory Tests


6/16/21 19:31: Glucometer 106


6/16/21 20:37: Glucometer 130H


6/17/21 05:15: 


White Blood Count 11.7H, Red Blood Count 3.40L, Hemoglobin 8.3L, Hematocrit 28L,

Mean Corpuscular Volume 81, Mean Corpuscular Hemoglobin 24L, Mean Corpuscular 

Hemoglobin Concent 30L, Red Cell Distribution Width 18.6H, Platelet Count 381, 

Mean Platelet Volume 9.2, Immature Granulocyte % (Auto) 1, Neutrophils (%) 

(Auto) 82H, Lymphocytes (%) (Auto) 7L, Monocytes (%) (Auto) 7, Eosinophils (%) 

(Auto) 3, Basophils (%) (Auto) 0, Neutrophils # (Auto) 9.6H, Lymphocytes # 

(Auto) 0.9L, Monocytes # (Auto) 0.8, Eosinophils # (Auto) 0.4H, Basophils # 

(Auto) 0.0, Immature Granulocyte # (Auto) 0.1, Prothrombin Time 46.0*H, INR 

Comment 4.9H, Sodium Level 135, Potassium Level 2.8L, Chloride Level 96L, Carbon

Dioxide Level 25, Anion Gap 14, Blood Urea Nitrogen 35H, Creatinine 2.12H, 

Estimat Glomerular Filtration Rate 24, BUN/Creatinine Ratio 17, Glucose Level 

102, Calcium Level 9.0


6/17/21 10:18: Glucometer 105


6/17/21 15:24: Glucometer 101





Microbiology


6/16/21 Blood Culture - Preliminary, Resulted


          Enterobacter cloacae complex


6/16/21 Urine Culture - Preliminary, Resulted


          Gram Negative Elian





Microbiology


6/16/21 Blood Culture - Preliminary, Resulted


          Enterobacter cloacae complex


6/16/21 Blood Culture - Preliminary, Resulted


          Enterobacter cloacae complex


          Probable Staph Aureus


6/16/21 Urine Culture - Preliminary, Resulted


          Gram Negative Elian





Assessment/Plan/Dx


1. Sacral pressure ulcer, indeterminate severity.





2. Morbid obesity.





3. Limited mobility.





Plan:  Will sign off.











JAVIER MORAN MD             Jun 17, 2021 18:21

## 2021-06-18 VITALS — SYSTOLIC BLOOD PRESSURE: 125 MMHG | DIASTOLIC BLOOD PRESSURE: 77 MMHG

## 2021-06-18 VITALS — DIASTOLIC BLOOD PRESSURE: 84 MMHG | SYSTOLIC BLOOD PRESSURE: 132 MMHG

## 2021-06-18 VITALS — DIASTOLIC BLOOD PRESSURE: 80 MMHG | SYSTOLIC BLOOD PRESSURE: 125 MMHG

## 2021-06-18 VITALS — DIASTOLIC BLOOD PRESSURE: 77 MMHG | SYSTOLIC BLOOD PRESSURE: 125 MMHG

## 2021-06-18 VITALS — SYSTOLIC BLOOD PRESSURE: 112 MMHG | DIASTOLIC BLOOD PRESSURE: 73 MMHG

## 2021-06-18 LAB
BUN/CREAT SERPL: 19
CALCIUM SERPL-MCNC: 9.1 MG/DL (ref 8.5–10.1)
CHLORIDE SERPL-SCNC: 101 MMOL/L (ref 98–107)
CO2 SERPL-SCNC: 22 MMOL/L (ref 21–32)
CREAT SERPL-MCNC: 1.53 MG/DL (ref 0.6–1.3)
GFR SERPLBLD BASED ON 1.73 SQ M-ARVRAT: 35 ML/MIN
GLUCOSE SERPL-MCNC: 79 MG/DL (ref 70–105)
HCT VFR BLD CALC: 27 % (ref 35–52)
HGB BLD-MCNC: 8.3 G/DL (ref 11.5–16)
INR PPP: 3.8 (ref 0.8–1.4)
MCH RBC QN AUTO: 25 PG (ref 25–34)
MCHC RBC AUTO-ENTMCNC: 30 G/DL (ref 32–36)
MCV RBC AUTO: 81 FL (ref 80–99)
PLATELET # BLD: 323 10^3/UL (ref 130–400)
PMV BLD AUTO: 9.3 FL (ref 9–12.2)
POTASSIUM SERPL-SCNC: 3 MMOL/L (ref 3.6–5)
PROTHROMBIN TIME: 37.9 SEC (ref 12.2–14.7)
SODIUM SERPL-SCNC: 136 MMOL/L (ref 135–145)
WBC # BLD AUTO: 7.4 10^3/UL (ref 4.3–11)

## 2021-06-18 RX ADMIN — FENTANYL CITRATE PRN MCG: 50 INJECTION, SOLUTION INTRAMUSCULAR; INTRAVENOUS at 16:38

## 2021-06-18 RX ADMIN — FENTANYL CITRATE PRN MCG: 50 INJECTION, SOLUTION INTRAMUSCULAR; INTRAVENOUS at 10:54

## 2021-06-18 RX ADMIN — BACLOFEN SCH MG: 10 TABLET ORAL at 09:41

## 2021-06-18 RX ADMIN — INSULIN ASPART SCH UNIT: 100 INJECTION, SOLUTION INTRAVENOUS; SUBCUTANEOUS at 10:57

## 2021-06-18 RX ADMIN — SODIUM CHLORIDE SCH MLS/HR: 900 INJECTION, SOLUTION INTRAVENOUS at 01:15

## 2021-06-18 RX ADMIN — ALLOPURINOL SCH MG: 100 TABLET ORAL at 09:49

## 2021-06-18 RX ADMIN — FENTANYL CITRATE PRN MCG: 50 INJECTION, SOLUTION INTRAMUSCULAR; INTRAVENOUS at 01:54

## 2021-06-18 RX ADMIN — SODIUM CHLORIDE SCH MLS/HR: 900 INJECTION, SOLUTION INTRAVENOUS at 09:42

## 2021-06-18 RX ADMIN — HYDROCODONE BITARTRATE AND ACETAMINOPHEN PRN EA: 5; 325 TABLET ORAL at 16:11

## 2021-06-18 RX ADMIN — PRAMIPEXOLE DIHYDROCHLORIDE SCH MG: 0.5 TABLET ORAL at 09:49

## 2021-06-18 RX ADMIN — FENTANYL CITRATE PRN MCG: 50 INJECTION, SOLUTION INTRAMUSCULAR; INTRAVENOUS at 03:24

## 2021-06-18 RX ADMIN — VENLAFAXINE HYDROCHLORIDE SCH MG: 75 CAPSULE, EXTENDED RELEASE ORAL at 09:49

## 2021-06-18 RX ADMIN — INSULIN ASPART SCH UNIT: 100 INJECTION, SOLUTION INTRAVENOUS; SUBCUTANEOUS at 07:02

## 2021-06-18 RX ADMIN — INSULIN ASPART SCH UNIT: 100 INJECTION, SOLUTION INTRAVENOUS; SUBCUTANEOUS at 16:41

## 2021-06-18 RX ADMIN — BACLOFEN SCH MG: 10 TABLET ORAL at 14:33

## 2021-06-18 RX ADMIN — FENTANYL CITRATE PRN MCG: 50 INJECTION, SOLUTION INTRAMUSCULAR; INTRAVENOUS at 08:11

## 2021-06-18 RX ADMIN — PRAMIPEXOLE DIHYDROCHLORIDE SCH MG: 0.5 TABLET ORAL at 14:33

## 2021-06-18 NOTE — DIAGNOSTIC IMAGING REPORT
INDICATION: History of previous femoral fracture 2 years ago with

persistent pain.



4 views of the right femur. Study is very limited due to morbid

obesity. Patient was unable to position. Films are

underpenetrated.



FINDINGS: Long stem gamma nail shows compression screw to be most

likely in good position in the femoral head though detail is

limited. There is a angulation of the interlocking screw distally

which presumably is fractured. There is no lucency about the dorie

to suggest loosening. There are no fractures demonstrated.



IMPRESSION: Limited study with probable fractured interlocking

screw distally otherwise no abnormalities noted.



Dictated by: 



  Dictated on workstation # [id number]

## 2021-06-18 NOTE — DISCHARGE INST-SIMPLE/STANDARD
Discharge Inst-Standard


Discharge Medications


New, Converted or Re-Newed RX:  Transmitted to Pharmacy





Patient Instructions/Follow Up


Plan of Care/Instructions/FU:  


Please continue to take your medications as written. Please follow up with


your primary care doctor  follow up this hospital stay.


Activity as Tolerated:  Yes


Discharge Diet:  No Restrictions


Return to The Hospital For:  


Chest pain, shortness of breath, confusion, weakness, if you feel you are


getting worse.











ROBERT VILLALTA MD              Jun 18, 2021 11:42

## 2022-08-19 ENCOUNTER — HOSPITAL ENCOUNTER (OUTPATIENT)
Dept: HOSPITAL 75 - ER | Age: 57
End: 2022-08-19
Attending: NURSE PRACTITIONER
Payer: MEDICAID

## 2022-08-19 VITALS — DIASTOLIC BLOOD PRESSURE: 63 MMHG | SYSTOLIC BLOOD PRESSURE: 101 MMHG

## 2022-08-19 VITALS — BODY MASS INDEX: 45.99 KG/M2 | HEIGHT: 66.93 IN | WEIGHT: 293 LBS

## 2022-08-19 DIAGNOSIS — L03.115: Primary | ICD-10-CM

## 2022-08-19 PROCEDURE — 36569 INSJ PICC 5 YR+ W/O IMAGING: CPT

## 2022-08-19 PROCEDURE — 76937 US GUIDE VASCULAR ACCESS: CPT
